# Patient Record
Sex: MALE | Race: WHITE | Employment: FULL TIME | URBAN - METROPOLITAN AREA
[De-identification: names, ages, dates, MRNs, and addresses within clinical notes are randomized per-mention and may not be internally consistent; named-entity substitution may affect disease eponyms.]

---

## 2024-02-18 LAB — HBA1C MFR BLD HPLC: 6.3 %

## 2024-03-15 ENCOUNTER — OFFICE VISIT (OUTPATIENT)
Dept: FAMILY MEDICINE CLINIC | Facility: CLINIC | Age: 64
End: 2024-03-15
Payer: COMMERCIAL

## 2024-03-15 VITALS
DIASTOLIC BLOOD PRESSURE: 90 MMHG | WEIGHT: 197.6 LBS | HEART RATE: 92 BPM | BODY MASS INDEX: 31.01 KG/M2 | HEIGHT: 67 IN | SYSTOLIC BLOOD PRESSURE: 158 MMHG | OXYGEN SATURATION: 94 % | TEMPERATURE: 98.3 F | RESPIRATION RATE: 16 BRPM

## 2024-03-15 DIAGNOSIS — I10 PRIMARY HYPERTENSION: ICD-10-CM

## 2024-03-15 DIAGNOSIS — Z86.73 HISTORY OF CVA (CEREBROVASCULAR ACCIDENT): ICD-10-CM

## 2024-03-15 DIAGNOSIS — R04.0 EPISTAXIS: ICD-10-CM

## 2024-03-15 DIAGNOSIS — Z76.89 ESTABLISHING CARE WITH NEW DOCTOR, ENCOUNTER FOR: Primary | ICD-10-CM

## 2024-03-15 DIAGNOSIS — E78.2 MIXED HYPERLIPIDEMIA: ICD-10-CM

## 2024-03-15 PROCEDURE — 99204 OFFICE O/P NEW MOD 45 MIN: CPT | Performed by: FAMILY MEDICINE

## 2024-03-15 RX ORDER — CARVEDILOL 12.5 MG/1
TABLET ORAL
COMMUNITY
Start: 2024-02-20 | End: 2024-03-15 | Stop reason: SDUPTHER

## 2024-03-15 RX ORDER — CLOPIDOGREL BISULFATE 75 MG/1
TABLET ORAL
COMMUNITY
Start: 2024-02-20 | End: 2024-03-15

## 2024-03-15 RX ORDER — ATORVASTATIN CALCIUM 80 MG/1
TABLET, FILM COATED ORAL
COMMUNITY
Start: 2024-02-20 | End: 2024-03-15 | Stop reason: SDUPTHER

## 2024-03-15 RX ORDER — ATORVASTATIN CALCIUM 80 MG/1
80 TABLET, FILM COATED ORAL DAILY
Qty: 90 TABLET | Refills: 1 | Status: SHIPPED | OUTPATIENT
Start: 2024-03-15

## 2024-03-15 RX ORDER — CARVEDILOL 12.5 MG/1
12.5 TABLET ORAL 2 TIMES DAILY WITH MEALS
Qty: 180 TABLET | Refills: 1 | Status: SHIPPED | OUTPATIENT
Start: 2024-03-15

## 2024-03-15 RX ORDER — AMLODIPINE BESYLATE 10 MG/1
10 TABLET ORAL DAILY
Qty: 90 TABLET | Refills: 1 | Status: SHIPPED | OUTPATIENT
Start: 2024-03-15

## 2024-03-15 RX ORDER — AMLODIPINE BESYLATE 10 MG/1
TABLET ORAL
COMMUNITY
Start: 2024-02-20 | End: 2024-03-15 | Stop reason: SDUPTHER

## 2024-03-15 NOTE — PROGRESS NOTES
Name: Doron Braxton      : 1960      MRN: [id number]  Encounter Provider: Ruben Church MD  Encounter Date: 3/15/2024   Encounter department: [facility]    Assessment & Plan     1. Establishing care with new doctor, encounter for    2. Primary hypertension    3. Mixed hyperlipidemia    4. Epistaxis    5. History of CVA (cerebrovascular accident)        Depression Screening and Follow-up Plan: Patient was screened for depression during today's encounter. They screened negative with a PHQ-2 score of 0.        Subjective      PATIENT IS A NEW PATIENT TO THE PRACTICE      REVIEWED PAST MEDICAL HISTORY AND MEDICATIONS  DISCUSSED PRACTICE AND HEALTH ISSUES    RECENT ER VISIT FOR EPISTAXIS  ELEVATED BP / ELEVATED CHOL    DISCUSSED THERAPEUTIC PLAN      Review of Systems   Constitutional:  Negative for chills, fatigue and fever.   HENT:  Negative for congestion, ear discharge, ear pain, mouth sores, postnasal drip, sore throat and trouble swallowing.    Eyes:  Negative for pain, discharge and visual disturbance.   Respiratory:  Negative for cough, shortness of breath and wheezing.    Cardiovascular:  Negative for chest pain, palpitations and leg swelling.   Gastrointestinal:  Negative for abdominal distention, abdominal pain, blood in stool, diarrhea and nausea.   Endocrine: Negative for polydipsia, polyphagia and polyuria.   Genitourinary:  Negative for dysuria, frequency, hematuria and urgency.   Musculoskeletal:  Negative for arthralgias, gait problem and joint swelling.   Skin:  Negative for pallor and rash.   Neurological:  Negative for dizziness, syncope, speech difficulty, weakness, light-headedness, numbness and headaches.   Hematological:  Negative for adenopathy.   Psychiatric/Behavioral:  Negative for behavioral problems, confusion and sleep disturbance. The patient is not nervous/anxious.        Current Outpatient Medications on File Prior to Visit   Medication Sig   •  "amLODIPine (NORVASC) 10 mg tablet    • atorvastatin (LIPITOR) 80 mg tablet    • carvedilol (COREG) 12.5 mg tablet    • clopidogrel (PLAVIX) 75 mg tablet  (Patient not taking: Reported on 3/15/2024)       Objective     /90   Pulse 92   Temp 98.3 °F (36.8 °C) (Temporal)   Resp 16   Ht 5' 7\" (1.702 m)   Wt 89.6 kg (197 lb 9.6 oz)   SpO2 94%   BMI 30.95 kg/m²     Physical Exam  Vitals reviewed.   Constitutional:       General: He is not in acute distress.     Appearance: Normal appearance. He is well-developed. He is not ill-appearing.   HENT:      Head: Normocephalic and atraumatic.   Eyes:      General:         Right eye: No discharge.         Left eye: No discharge.      Conjunctiva/sclera: Conjunctivae normal.      Pupils: Pupils are equal, round, and reactive to light.   Neck:      Thyroid: No thyromegaly.      Vascular: No JVD.   Cardiovascular:      Rate and Rhythm: Normal rate and regular rhythm.      Heart sounds: Normal heart sounds. No murmur heard.  Pulmonary:      Effort: Pulmonary effort is normal.      Breath sounds: Normal breath sounds. No wheezing or rales.   Abdominal:      General: Bowel sounds are normal.      Palpations: Abdomen is soft. There is no mass.      Tenderness: There is no abdominal tenderness. There is no guarding or rebound.   Musculoskeletal:         General: No tenderness or deformity. Normal range of motion.      Cervical back: Neck supple.   Lymphadenopathy:      Cervical: No cervical adenopathy.   Skin:     General: Skin is warm and dry.      Findings: No erythema or rash.   Neurological:      General: No focal deficit present.      Mental Status: He is alert and oriented to person, place, and time.   Psychiatric:         Mood and Affect: Mood normal.         Behavior: Behavior normal.         Thought Content: Thought content normal.         Judgment: Judgment normal.       Ruben Church MD    "

## 2024-03-15 NOTE — PATIENT INSTRUCTIONS
DISCUSSED HEALTH AND PRACTICE ISSUES    HYDRATION  NASAL SALINE SPRAY  MONITOR BP  MEDICATION AS DIRECTED    RV 2-3 M FOR CPX, SOONER PRN

## 2024-04-10 DIAGNOSIS — Z13.29 SCREENING FOR THYROID DISORDER: ICD-10-CM

## 2024-04-10 DIAGNOSIS — Z12.5 SCREENING FOR PROSTATE CANCER: ICD-10-CM

## 2024-04-10 DIAGNOSIS — E78.2 MIXED HYPERLIPIDEMIA: ICD-10-CM

## 2024-04-10 DIAGNOSIS — Z00.00 ROUTINE GENERAL MEDICAL EXAMINATION AT A HEALTH CARE FACILITY: ICD-10-CM

## 2024-04-10 DIAGNOSIS — Z11.59 NEED FOR HEPATITIS C SCREENING TEST: ICD-10-CM

## 2024-04-10 DIAGNOSIS — Z11.4 SCREENING FOR HIV (HUMAN IMMUNODEFICIENCY VIRUS): ICD-10-CM

## 2024-04-10 DIAGNOSIS — I10 PRIMARY HYPERTENSION: Primary | ICD-10-CM

## 2024-04-14 DIAGNOSIS — E78.2 MIXED HYPERLIPIDEMIA: ICD-10-CM

## 2024-04-14 DIAGNOSIS — Z86.73 HISTORY OF CVA (CEREBROVASCULAR ACCIDENT): ICD-10-CM

## 2024-04-14 DIAGNOSIS — I10 PRIMARY HYPERTENSION: ICD-10-CM

## 2024-04-15 RX ORDER — AMLODIPINE BESYLATE 10 MG/1
10 TABLET ORAL DAILY
Qty: 90 TABLET | Refills: 1 | Status: SHIPPED | OUTPATIENT
Start: 2024-04-15

## 2024-04-15 RX ORDER — CARVEDILOL 12.5 MG/1
12.5 TABLET ORAL 2 TIMES DAILY WITH MEALS
Qty: 180 TABLET | Refills: 1 | Status: SHIPPED | OUTPATIENT
Start: 2024-04-15

## 2024-04-15 RX ORDER — ATORVASTATIN CALCIUM 80 MG/1
80 TABLET, FILM COATED ORAL DAILY
Qty: 90 TABLET | Refills: 1 | Status: SHIPPED | OUTPATIENT
Start: 2024-04-15

## 2024-05-15 LAB
ALBUMIN SERPL-MCNC: 4.5 G/DL (ref 3.9–4.9)
ALBUMIN/GLOB SERPL: 1.8 {RATIO} (ref 1.2–2.2)
ALP SERPL-CCNC: 130 IU/L (ref 44–121)
ALT SERPL-CCNC: 22 IU/L (ref 0–44)
AST SERPL-CCNC: 27 IU/L (ref 0–40)
BASOPHILS # BLD AUTO: 0.1 X10E3/UL (ref 0–0.2)
BASOPHILS NFR BLD AUTO: 1 %
BILIRUB SERPL-MCNC: 0.8 MG/DL (ref 0–1.2)
BUN SERPL-MCNC: 18 MG/DL (ref 8–27)
BUN/CREAT SERPL: 16 (ref 10–24)
CALCIUM SERPL-MCNC: 9.4 MG/DL (ref 8.6–10.2)
CHLORIDE SERPL-SCNC: 104 MMOL/L (ref 96–106)
CHOLEST SERPL-MCNC: 135 MG/DL (ref 100–199)
CHOLEST/HDLC SERPL: 3.3 RATIO (ref 0–5)
CO2 SERPL-SCNC: 24 MMOL/L (ref 20–29)
CREAT SERPL-MCNC: 1.11 MG/DL (ref 0.76–1.27)
EGFR: 75 ML/MIN/1.73
EOSINOPHIL # BLD AUTO: 0.4 X10E3/UL (ref 0–0.4)
EOSINOPHIL NFR BLD AUTO: 4 %
ERYTHROCYTE [DISTWIDTH] IN BLOOD BY AUTOMATED COUNT: 14.4 % (ref 11.6–15.4)
GLOBULIN SER-MCNC: 2.5 G/DL (ref 1.5–4.5)
GLUCOSE SERPL-MCNC: 101 MG/DL (ref 70–99)
HCT VFR BLD AUTO: 38.2 % (ref 37.5–51)
HCV AB S/CO SERPL IA: NON REACTIVE
HDLC SERPL-MCNC: 41 MG/DL
HGB BLD-MCNC: 12.4 G/DL (ref 13–17.7)
HIV 1+2 AB+HIV1 P24 AG SERPL QL IA: NON REACTIVE
IMM GRANULOCYTES # BLD: 0 X10E3/UL (ref 0–0.1)
IMM GRANULOCYTES NFR BLD: 0 %
LDLC SERPL CALC-MCNC: 75 MG/DL (ref 0–99)
LYMPHOCYTES # BLD AUTO: 2 X10E3/UL (ref 0.7–3.1)
LYMPHOCYTES NFR BLD AUTO: 18 %
MCH RBC QN AUTO: 28.6 PG (ref 26.6–33)
MCHC RBC AUTO-ENTMCNC: 32.5 G/DL (ref 31.5–35.7)
MCV RBC AUTO: 88 FL (ref 79–97)
MONOCYTES # BLD AUTO: 0.9 X10E3/UL (ref 0.1–0.9)
MONOCYTES NFR BLD AUTO: 8 %
NEUTROPHILS # BLD AUTO: 7.5 X10E3/UL (ref 1.4–7)
NEUTROPHILS NFR BLD AUTO: 69 %
PLATELET # BLD AUTO: 260 X10E3/UL (ref 150–450)
POTASSIUM SERPL-SCNC: 4.5 MMOL/L (ref 3.5–5.2)
PROT SERPL-MCNC: 7 G/DL (ref 6–8.5)
PSA SERPL-MCNC: 2.6 NG/ML (ref 0–4)
RBC # BLD AUTO: 4.33 X10E6/UL (ref 4.14–5.8)
SL AMB REFLEX CRITERIA: NORMAL
SL AMB VLDL CHOLESTEROL CALC: 19 MG/DL (ref 5–40)
SODIUM SERPL-SCNC: 141 MMOL/L (ref 134–144)
TRIGL SERPL-MCNC: 105 MG/DL (ref 0–149)
TSH SERPL DL<=0.005 MIU/L-ACNC: 4.13 UIU/ML (ref 0.45–4.5)
WBC # BLD AUTO: 10.9 X10E3/UL (ref 3.4–10.8)

## 2024-05-18 DIAGNOSIS — E78.2 MIXED HYPERLIPIDEMIA: ICD-10-CM

## 2024-05-18 DIAGNOSIS — I10 PRIMARY HYPERTENSION: ICD-10-CM

## 2024-05-18 DIAGNOSIS — Z86.73 HISTORY OF CVA (CEREBROVASCULAR ACCIDENT): ICD-10-CM

## 2024-05-18 RX ORDER — ATORVASTATIN CALCIUM 80 MG/1
80 TABLET, FILM COATED ORAL DAILY
Qty: 90 TABLET | Refills: 1 | Status: SHIPPED | OUTPATIENT
Start: 2024-05-18

## 2024-05-18 RX ORDER — AMLODIPINE BESYLATE 10 MG/1
10 TABLET ORAL DAILY
Qty: 90 TABLET | Refills: 1 | Status: SHIPPED | OUTPATIENT
Start: 2024-05-18

## 2024-05-18 RX ORDER — CARVEDILOL 12.5 MG/1
12.5 TABLET ORAL 2 TIMES DAILY WITH MEALS
Qty: 180 TABLET | Refills: 1 | Status: SHIPPED | OUTPATIENT
Start: 2024-05-18

## 2024-06-12 ENCOUNTER — OFFICE VISIT (OUTPATIENT)
Dept: FAMILY MEDICINE CLINIC | Facility: CLINIC | Age: 64
End: 2024-06-12
Payer: COMMERCIAL

## 2024-06-12 VITALS
SYSTOLIC BLOOD PRESSURE: 136 MMHG | OXYGEN SATURATION: 95 % | HEIGHT: 68 IN | TEMPERATURE: 97.6 F | DIASTOLIC BLOOD PRESSURE: 84 MMHG | BODY MASS INDEX: 29.86 KG/M2 | WEIGHT: 197 LBS | RESPIRATION RATE: 16 BRPM | HEART RATE: 83 BPM

## 2024-06-12 DIAGNOSIS — Z86.73 HISTORY OF CVA (CEREBROVASCULAR ACCIDENT): ICD-10-CM

## 2024-06-12 DIAGNOSIS — Z13.29 SCREENING FOR HYPOTHYROIDISM: ICD-10-CM

## 2024-06-12 DIAGNOSIS — Z00.00 ROUTINE GENERAL MEDICAL EXAMINATION AT A HEALTH CARE FACILITY: Primary | ICD-10-CM

## 2024-06-12 DIAGNOSIS — Z12.11 COLON CANCER SCREENING: ICD-10-CM

## 2024-06-12 DIAGNOSIS — E78.2 MIXED HYPERLIPIDEMIA: ICD-10-CM

## 2024-06-12 DIAGNOSIS — D64.9 ANEMIA, UNSPECIFIED TYPE: ICD-10-CM

## 2024-06-12 DIAGNOSIS — Z12.5 ENCOUNTER FOR PROSTATE CANCER SCREENING: ICD-10-CM

## 2024-06-12 DIAGNOSIS — I10 PRIMARY HYPERTENSION: ICD-10-CM

## 2024-06-12 PROBLEM — R04.0 EPISTAXIS: Status: RESOLVED | Noted: 2024-03-15 | Resolved: 2024-06-12

## 2024-06-12 LAB — SL AMB POCT FECES OCC BLD: NORMAL

## 2024-06-12 PROCEDURE — 82270 OCCULT BLOOD FECES: CPT | Performed by: FAMILY MEDICINE

## 2024-06-12 PROCEDURE — 99396 PREV VISIT EST AGE 40-64: CPT | Performed by: FAMILY MEDICINE

## 2024-06-12 RX ORDER — CARVEDILOL 25 MG/1
25 TABLET ORAL 2 TIMES DAILY WITH MEALS
COMMUNITY
Start: 2024-06-11

## 2024-06-12 NOTE — PATIENT INSTRUCTIONS
DISCUSSED HEALTH MAINTENENCE ISSUES  BW WILL BE TVIEWED  ENCOURAGED HEALTHY DIET AND EXERCISE  COLONOSCOPY WILL BE ORDERED  RV FOR ANNUAL HEALTH EXAM IN 1 YEAR  RV SOONER IF THERE ARE ANY CONCERNS    RV 6M    Recent Results (from the past 2688 hour(s))   CBC and differential    Collection Time: 05/14/24 10:20 AM   Result Value Ref Range    White Blood Cell Count 10.9 (H) 3.4 - 10.8 x10E3/uL    Red Blood Cell Count 4.33 4.14 - 5.80 x10E6/uL    Hemoglobin 12.4 (L) 13.0 - 17.7 g/dL    HCT 38.2 37.5 - 51.0 %    MCV 88 79 - 97 fL    MCH 28.6 26.6 - 33.0 pg    MCHC 32.5 31.5 - 35.7 g/dL    RDW 14.4 11.6 - 15.4 %    Platelet Count 260 150 - 450 x10E3/uL    Neutrophils 69 Not Estab. %    Lymphocytes 18 Not Estab. %    Monocytes 8 Not Estab. %    Eosinophils 4 Not Estab. %    Basophils PCT 1 Not Estab. %    Neutrophils (Absolute) 7.5 (H) 1.4 - 7.0 x10E3/uL    Lymphocytes (Absolute) 2.0 0.7 - 3.1 x10E3/uL    Monocytes (Absolute) 0.9 0.1 - 0.9 x10E3/uL    Eosinophils (Absolute) 0.4 0.0 - 0.4 x10E3/uL    Basophils ABS 0.1 0.0 - 0.2 x10E3/uL    Immature Granulocytes 0 Not Estab. %    Immature Granulocytes (Absolute) 0.0 0.0 - 0.1 x10E3/uL   Comprehensive metabolic panel    Collection Time: 05/14/24 10:20 AM   Result Value Ref Range    Glucose, Random 101 (H) 70 - 99 mg/dL    BUN 18 8 - 27 mg/dL    Creatinine 1.11 0.76 - 1.27 mg/dL    eGFR 75 >59 mL/min/1.73    SL AMB BUN/CREATININE RATIO 16 10 - 24    Sodium 141 134 - 144 mmol/L    Potassium 4.5 3.5 - 5.2 mmol/L    Chloride 104 96 - 106 mmol/L    CO2 24 20 - 29 mmol/L    CALCIUM 9.4 8.6 - 10.2 mg/dL    Protein, Total 7.0 6.0 - 8.5 g/dL    Albumin 4.5 3.9 - 4.9 g/dL    Globulin, Total 2.5 1.5 - 4.5 g/dL    Albumin/Globulin Ratio 1.8 1.2 - 2.2    TOTAL BILIRUBIN 0.8 0.0 - 1.2 mg/dL    Alk Phos Isoenzymes 130 (H) 44 - 121 IU/L    AST 27 0 - 40 IU/L    ALT 22 0 - 44 IU/L   Hepatitis C antibody    Collection Time: 05/14/24 10:20 AM   Result Value Ref Range    HEP C AB Non Reactive  Non Reactive   Human Immunodeficiency Virus 1/2 Antigen / Antibody ( Fourth Generation) with Reflex Testing    Collection Time: 05/14/24 10:20 AM   Result Value Ref Range    HIV Screen 4th Generation wRflx Non Reactive Non Reactive   Lipid panel    Collection Time: 05/14/24 10:20 AM   Result Value Ref Range    Cholesterol, Total 135 100 - 199 mg/dL    Triglycerides 105 0 - 149 mg/dL    HDL 41 >39 mg/dL    VLDL Cholesterol Calculated 19 5 - 40 mg/dL    LDL Calculated 75 0 - 99 mg/dL    T. Chol/HDL Ratio 3.3 0.0 - 5.0 ratio   PSA Total (Reflex To Free)    Collection Time: 05/14/24 10:20 AM   Result Value Ref Range    Prostate Specific Antigen Total 2.6 0.0 - 4.0 ng/mL    Reflex Criteria Comment    TSH, 3rd generation    Collection Time: 05/14/24 10:20 AM   Result Value Ref Range    TSH 4.130 0.450 - 4.500 uIU/mL

## 2024-06-12 NOTE — LETTER
EMANUEL EPPS      Current Outpatient Medications:     amLODIPine (NORVASC) 10 mg tablet, Take 1 tablet (10 mg total) by mouth daily, Disp: 90 tablet, Rfl: 1    atorvastatin (LIPITOR) 80 mg tablet, Take 1 tablet (80 mg total) by mouth daily, Disp: 90 tablet, Rfl: 1    carvedilol (COREG) 12.5 mg tablet, Take 1 tablet (12.5 mg total) by mouth 2 (two) times a day with meals, Disp: 180 tablet, Rfl: 1    Recent Results (from the past 2688 hour(s))   CBC and differential    Collection Time: 05/14/24 10:20 AM   Result Value Ref Range    White Blood Cell Count 10.9 (H) 3.4 - 10.8 x10E3/uL    Red Blood Cell Count 4.33 4.14 - 5.80 x10E6/uL    Hemoglobin 12.4 (L) 13.0 - 17.7 g/dL    HCT 38.2 37.5 - 51.0 %    MCV 88 79 - 97 fL    MCH 28.6 26.6 - 33.0 pg    MCHC 32.5 31.5 - 35.7 g/dL    RDW 14.4 11.6 - 15.4 %    Platelet Count 260 150 - 450 x10E3/uL    Neutrophils 69 Not Estab. %    Lymphocytes 18 Not Estab. %    Monocytes 8 Not Estab. %    Eosinophils 4 Not Estab. %    Basophils PCT 1 Not Estab. %    Neutrophils (Absolute) 7.5 (H) 1.4 - 7.0 x10E3/uL    Lymphocytes (Absolute) 2.0 0.7 - 3.1 x10E3/uL    Monocytes (Absolute) 0.9 0.1 - 0.9 x10E3/uL    Eosinophils (Absolute) 0.4 0.0 - 0.4 x10E3/uL    Basophils ABS 0.1 0.0 - 0.2 x10E3/uL    Immature Granulocytes 0 Not Estab. %    Immature Granulocytes (Absolute) 0.0 0.0 - 0.1 x10E3/uL   Comprehensive metabolic panel    Collection Time: 05/14/24 10:20 AM   Result Value Ref Range    Glucose, Random 101 (H) 70 - 99 mg/dL    BUN 18 8 - 27 mg/dL    Creatinine 1.11 0.76 - 1.27 mg/dL    eGFR 75 >59 mL/min/1.73    SL AMB BUN/CREATININE RATIO 16 10 - 24    Sodium 141 134 - 144 mmol/L    Potassium 4.5 3.5 - 5.2 mmol/L    Chloride 104 96 - 106 mmol/L    CO2 24 20 - 29 mmol/L    CALCIUM 9.4 8.6 - 10.2 mg/dL    Protein, Total 7.0 6.0 - 8.5 g/dL    Albumin 4.5 3.9 - 4.9 g/dL    Globulin, Total 2.5 1.5 - 4.5 g/dL    Albumin/Globulin Ratio 1.8 1.2 - 2.2    TOTAL BILIRUBIN 0.8 0.0 - 1.2 mg/dL    Alk  Phos Isoenzymes 130 (H) 44 - 121 IU/L    AST 27 0 - 40 IU/L    ALT 22 0 - 44 IU/L   Hepatitis C antibody    Collection Time: 05/14/24 10:20 AM   Result Value Ref Range    HEP C AB Non Reactive Non Reactive   Human Immunodeficiency Virus 1/2 Antigen / Antibody ( Fourth Generation) with Reflex Testing    Collection Time: 05/14/24 10:20 AM   Result Value Ref Range    HIV Screen 4th Generation wRflx Non Reactive Non Reactive   Lipid panel    Collection Time: 05/14/24 10:20 AM   Result Value Ref Range    Cholesterol, Total 135 100 - 199 mg/dL    Triglycerides 105 0 - 149 mg/dL    HDL 41 >39 mg/dL    VLDL Cholesterol Calculated 19 5 - 40 mg/dL    LDL Calculated 75 0 - 99 mg/dL    T. Chol/HDL Ratio 3.3 0.0 - 5.0 ratio   PSA Total (Reflex To Free)    Collection Time: 05/14/24 10:20 AM   Result Value Ref Range    Prostate Specific Antigen Total 2.6 0.0 - 4.0 ng/mL    Reflex Criteria Comment    TSH, 3rd generation    Collection Time: 05/14/24 10:20 AM   Result Value Ref Range    TSH 4.130 0.450 - 4.500 uIU/mL

## 2024-06-12 NOTE — PROGRESS NOTES
Depression Screening and Follow-up Plan: Patient was screened for depression during today's encounter. They screened negative with a PHQ-2 score of 0.    FAMILY PRACTICE HEALTH MAINTENANCE OFFICE VISIT  [facility]    NAME: Doron Braxton  AGE: 64 y.o. SEX: male  : 1960     DATE: 2024    Assessment and Plan     Problem List Items Addressed This Visit        Cardiovascular and Mediastinum    Primary hypertension    Relevant Medications    carvedilol (COREG) 25 mg tablet    Other Relevant Orders    Comprehensive metabolic panel       Neurology/Sleep    History of CVA (cerebrovascular accident)       Other    Mixed hyperlipidemia   Other Visit Diagnoses     Routine general medical examination at a health care facility    -  Primary    Screening for hypothyroidism        Colon cancer screening        Relevant Orders    POCT hemoccult screening (Completed)    Encounter for prostate cancer screening        Anemia, unspecified type        Relevant Orders    CBC and differential    Iron    Vitamin B12    Folate    Sedimentation rate, automated    Protein electrophoresis, serum               Return in about 6 weeks (around 2024) for Recheck.        Chief Complaint     Chief Complaint   Patient presents with   • Physical Exam       History of Present Illness     DISCUSSED HEALTH ISSUES  REVIEWED MEDICAL RECORD  NO CONCERNS AT THIS TIME            Well Adult Physical   Patient here for a comprehensive physical exam.      Diet and Physical Activity  Diet: well balanced diet  Weight concerns: Patient has class 1 obesity (BMI 30-34.9)  Exercise: occasionally      Depression Screen  PHQ-2/9 Depression Screening    Little interest or pleasure in doing things: 0 - not at all  Feeling down, depressed, or hopeless: 0 - not at all  PHQ-2 Score: 0  PHQ-2 Interpretation: Negative depression screen          General Health  Hearing: Normal:  bilateral  Vision: no  vision problems and wears glasses  Dental: regular dental visits    Reproductive Health          The following portions of the patient's history were reviewed and updated as appropriate: allergies, current medications, past family history, past medical history, past social history, past surgical history and problem list.    Review of Systems     Review of Systems   Constitutional:  Negative for chills, fatigue and fever.   HENT:  Negative for congestion, ear discharge, ear pain, mouth sores, postnasal drip, sore throat and trouble swallowing.    Eyes:  Negative for pain, discharge and visual disturbance.   Respiratory:  Negative for cough, shortness of breath and wheezing.    Cardiovascular:  Negative for chest pain, palpitations and leg swelling.   Gastrointestinal:  Negative for abdominal distention, abdominal pain, blood in stool, diarrhea and nausea.   Endocrine: Negative for polydipsia, polyphagia and polyuria.   Genitourinary:  Negative for dysuria, frequency, hematuria and urgency.   Musculoskeletal:  Negative for arthralgias, gait problem and joint swelling.   Skin:  Negative for pallor and rash.   Neurological:  Negative for dizziness, syncope, speech difficulty, weakness, light-headedness, numbness and headaches.   Hematological:  Negative for adenopathy.   Psychiatric/Behavioral:  Negative for behavioral problems, confusion and sleep disturbance. The patient is not nervous/anxious.        Past Medical History     History reviewed. No pertinent past medical history.    Past Surgical History     History reviewed. No pertinent surgical history.    Social History     Social History     Socioeconomic History   • Marital status: /Civil Union     Spouse name: None   • Number of children: None   • Years of education: None   • Highest education level: None   Occupational History   • None   Tobacco Use   • Smoking status: Never     Passive exposure: Never   • Smokeless tobacco: Never   Vaping Use   • Vaping  "status: Never Used   Substance and Sexual Activity   • Alcohol use: Yes     Alcohol/week: 2.0 standard drinks of alcohol     Types: 2 Cans of beer per week     Comment: socially   • Drug use: Never   • Sexual activity: None   Other Topics Concern   • None   Social History Narrative   • None     Social Determinants of Health     Financial Resource Strain: Not on file   Food Insecurity: Not on file   Transportation Needs: Not on file   Physical Activity: Not on file   Stress: Not on file   Social Connections: Not on file   Intimate Partner Violence: Not on file   Housing Stability: Not on file       Family History     History reviewed. No pertinent family history.    Current Medications       Current Outpatient Medications:   •  amLODIPine (NORVASC) 10 mg tablet, Take 1 tablet (10 mg total) by mouth daily, Disp: 90 tablet, Rfl: 1  •  atorvastatin (LIPITOR) 80 mg tablet, Take 1 tablet (80 mg total) by mouth daily, Disp: 90 tablet, Rfl: 1  •  carvedilol (COREG) 25 mg tablet, Take 25 mg by mouth 2 (two) times a day with meals, Disp: , Rfl:      Allergies     No Known Allergies    Objective     /84 (BP Location: Left arm, Patient Position: Sitting, Cuff Size: Large)   Pulse 83   Temp 97.6 °F (36.4 °C) (Temporal)   Resp 16   Ht 5' 7.5\" (1.715 m)   Wt 89.4 kg (197 lb)   SpO2 95%   BMI 30.40 kg/m²      Physical Exam  Constitutional:       General: He is not in acute distress.     Appearance: Normal appearance. He is well-developed. He is not ill-appearing.   HENT:      Head: Normocephalic and atraumatic.      Right Ear: Tympanic membrane and external ear normal.      Left Ear: Tympanic membrane and external ear normal.      Nose: Nose normal.      Mouth/Throat:      Mouth: Mucous membranes are moist.   Eyes:      General:         Right eye: No discharge.         Left eye: No discharge.      Conjunctiva/sclera: Conjunctivae normal.      Pupils: Pupils are equal, round, and reactive to light.   Neck:      Thyroid: " No thyromegaly.      Vascular: No JVD.   Cardiovascular:      Rate and Rhythm: Normal rate and regular rhythm.      Heart sounds: Normal heart sounds. No murmur heard.  Pulmonary:      Effort: Pulmonary effort is normal.      Breath sounds: Normal breath sounds. No wheezing or rales.   Abdominal:      General: Bowel sounds are normal.      Palpations: Abdomen is soft. There is no mass.      Tenderness: There is no abdominal tenderness. There is no guarding or rebound.   Genitourinary:     Penis: Normal.       Testes: Normal.      Prostate: Normal.      Rectum: Normal. Guaiac result negative.   Musculoskeletal:         General: No tenderness or deformity. Normal range of motion.      Cervical back: Neck supple.   Lymphadenopathy:      Cervical: No cervical adenopathy.   Skin:     General: Skin is warm and dry.      Findings: No erythema or rash.   Neurological:      General: No focal deficit present.      Mental Status: He is alert and oriented to person, place, and time.      Cranial Nerves: No cranial nerve deficit.      Sensory: No sensory deficit.      Motor: No weakness or abnormal muscle tone.      Coordination: Coordination normal.      Gait: Gait normal.      Deep Tendon Reflexes: Reflexes are normal and symmetric. Reflexes normal.   Psychiatric:         Mood and Affect: Mood normal.         Behavior: Behavior normal.         Thought Content: Thought content normal.         Judgment: Judgment normal.           No results found.    Health Maintenance     Health Maintenance   Topic Date Due   • DTaP,Tdap,and Td Vaccines (1 - Tdap) Never done   • Colorectal Cancer Screening  Never done   • Zoster Vaccine (1 of 2) Never done   • RSV Vaccine Age 60+ Years (1 - 1-dose 60+ series) Never done   • COVID-19 Vaccine (3 - 2023-24 season) 09/01/2023   • Influenza Vaccine (Season Ended) 09/01/2024   • Depression Screening  06/12/2025   • Annual Physical  06/12/2025   • HIV Screening  Completed   • Hepatitis C Screening   Completed   • RSV Vaccine age 0-20 Months  Aged Out   • Pneumococcal Vaccine: Pediatrics (0 to 5 Years) and At-Risk Patients (6 to 64 Years)  Aged Out   • HIB Vaccine  Aged Out   • IPV Vaccine  Aged Out   • Hepatitis A Vaccine  Aged Out   • Meningococcal ACWY Vaccine  Aged Out   • HPV Vaccine  Aged Out     Immunization History   Administered Date(s) Administered   • COVID-19 MODERNA VACC 0.5 ML IM 04/25/2021, 05/23/2021       Ruben Church MD  [facility]

## 2024-06-13 DIAGNOSIS — I10 PRIMARY HYPERTENSION: ICD-10-CM

## 2024-06-13 DIAGNOSIS — Z86.73 HISTORY OF CVA (CEREBROVASCULAR ACCIDENT): ICD-10-CM

## 2024-06-13 DIAGNOSIS — E78.2 MIXED HYPERLIPIDEMIA: ICD-10-CM

## 2024-06-13 RX ORDER — AMLODIPINE BESYLATE 10 MG/1
10 TABLET ORAL DAILY
Qty: 90 TABLET | Refills: 1 | Status: SHIPPED | OUTPATIENT
Start: 2024-06-13

## 2024-06-13 RX ORDER — ATORVASTATIN CALCIUM 80 MG/1
80 TABLET, FILM COATED ORAL DAILY
Qty: 90 TABLET | Refills: 1 | Status: SHIPPED | OUTPATIENT
Start: 2024-06-13

## 2024-07-13 DIAGNOSIS — Z86.73 HISTORY OF CVA (CEREBROVASCULAR ACCIDENT): ICD-10-CM

## 2024-07-13 DIAGNOSIS — I10 PRIMARY HYPERTENSION: ICD-10-CM

## 2024-07-13 DIAGNOSIS — E78.2 MIXED HYPERLIPIDEMIA: ICD-10-CM

## 2024-07-13 RX ORDER — CARVEDILOL 25 MG/1
25 TABLET ORAL 2 TIMES DAILY WITH MEALS
Qty: 180 TABLET | Refills: 1 | Status: SHIPPED | OUTPATIENT
Start: 2024-07-13

## 2024-07-13 RX ORDER — AMLODIPINE BESYLATE 10 MG/1
10 TABLET ORAL DAILY
Qty: 90 TABLET | Refills: 0 | Status: SHIPPED | OUTPATIENT
Start: 2024-07-13

## 2024-07-13 RX ORDER — ATORVASTATIN CALCIUM 80 MG/1
80 TABLET, FILM COATED ORAL DAILY
Qty: 90 TABLET | Refills: 1 | Status: SHIPPED | OUTPATIENT
Start: 2024-07-13

## 2024-07-25 LAB
ALBUMIN SERPL ELPH-MCNC: 3.6 G/DL (ref 2.9–4.4)
ALBUMIN SERPL-MCNC: 4.3 G/DL (ref 3.9–4.9)
ALBUMIN/GLOB SERPL: 1.1 {RATIO} (ref 0.7–1.7)
ALP SERPL-CCNC: 122 IU/L (ref 44–121)
ALPHA1 GLOB SERPL ELPH-MCNC: 0.2 G/DL (ref 0–0.4)
ALPHA2 GLOB SERPL ELPH-MCNC: 0.8 G/DL (ref 0.4–1)
ALT SERPL-CCNC: 21 IU/L (ref 0–44)
AST SERPL-CCNC: 22 IU/L (ref 0–40)
B-GLOBULIN SERPL ELPH-MCNC: 1.1 G/DL (ref 0.7–1.3)
BASOPHILS # BLD AUTO: 0.1 X10E3/UL (ref 0–0.2)
BASOPHILS NFR BLD AUTO: 1 %
BILIRUB SERPL-MCNC: 0.7 MG/DL (ref 0–1.2)
BUN SERPL-MCNC: 17 MG/DL (ref 8–27)
BUN/CREAT SERPL: 20 (ref 10–24)
CALCIUM SERPL-MCNC: 9.1 MG/DL (ref 8.6–10.2)
CHLORIDE SERPL-SCNC: 104 MMOL/L (ref 96–106)
CO2 SERPL-SCNC: 23 MMOL/L (ref 20–29)
CREAT SERPL-MCNC: 0.87 MG/DL (ref 0.76–1.27)
EGFR: 97 ML/MIN/1.73
EOSINOPHIL # BLD AUTO: 0.4 X10E3/UL (ref 0–0.4)
EOSINOPHIL NFR BLD AUTO: 4 %
ERYTHROCYTE [DISTWIDTH] IN BLOOD BY AUTOMATED COUNT: 15.2 % (ref 11.6–15.4)
ERYTHROCYTE [SEDIMENTATION RATE] IN BLOOD BY WESTERGREN METHOD: 6 MM/HR (ref 0–30)
FOLATE SERPL-MCNC: 14.9 NG/ML
GAMMA GLOB SERPL ELPH-MCNC: 1.1 G/DL (ref 0.4–1.8)
GLOBULIN SER CALC-MCNC: 3.2 G/DL (ref 2.2–3.9)
GLOBULIN SER-MCNC: 2.5 G/DL (ref 1.5–4.5)
GLUCOSE SERPL-MCNC: 106 MG/DL (ref 70–99)
HCT VFR BLD AUTO: 41.4 % (ref 37.5–51)
HGB BLD-MCNC: 13.6 G/DL (ref 13–17.7)
IMM GRANULOCYTES # BLD: 0 X10E3/UL (ref 0–0.1)
IMM GRANULOCYTES NFR BLD: 0 %
IRON SERPL-MCNC: 171 UG/DL (ref 38–169)
LABORATORY COMMENT REPORT: NORMAL
LYMPHOCYTES # BLD AUTO: 2.1 X10E3/UL (ref 0.7–3.1)
LYMPHOCYTES NFR BLD AUTO: 22 %
M PROTEIN SERPL ELPH-MCNC: NORMAL G/DL
MCH RBC QN AUTO: 29.7 PG (ref 26.6–33)
MCHC RBC AUTO-ENTMCNC: 32.9 G/DL (ref 31.5–35.7)
MCV RBC AUTO: 90 FL (ref 79–97)
MONOCYTES # BLD AUTO: 0.9 X10E3/UL (ref 0.1–0.9)
MONOCYTES NFR BLD AUTO: 9 %
NEUTROPHILS # BLD AUTO: 5.9 X10E3/UL (ref 1.4–7)
NEUTROPHILS NFR BLD AUTO: 64 %
PLATELET # BLD AUTO: 247 X10E3/UL (ref 150–450)
POTASSIUM SERPL-SCNC: 4.1 MMOL/L (ref 3.5–5.2)
PROT SERPL-MCNC: 6.8 G/DL (ref 6–8.5)
RBC # BLD AUTO: 4.58 X10E6/UL (ref 4.14–5.8)
SODIUM SERPL-SCNC: 141 MMOL/L (ref 134–144)
VIT B12 SERPL-MCNC: 582 PG/ML (ref 232–1245)
WBC # BLD AUTO: 9.4 X10E3/UL (ref 3.4–10.8)

## 2024-07-31 RX ORDER — ASPIRIN 81 MG/1
1 TABLET ORAL DAILY
COMMUNITY
Start: 2024-04-29

## 2024-07-31 NOTE — PATIENT INSTRUCTIONS
CONTINUE CURRENT TREATMENT PLAN  MONITOR DIETARY SODIUM, CHOL INTAKE  ENCOURAGE PHYSICAL ACTIVITY      RV 6 M, SOONER PRN      Recent Results (from the past 672 hour(s))   CBC and differential    Collection Time: 07/24/24  8:35 AM   Result Value Ref Range    White Blood Cell Count 9.4 3.4 - 10.8 x10E3/uL    Red Blood Cell Count 4.58 4.14 - 5.80 x10E6/uL    Hemoglobin 13.6 13.0 - 17.7 g/dL    HCT 41.4 37.5 - 51.0 %    MCV 90 79 - 97 fL    MCH 29.7 26.6 - 33.0 pg    MCHC 32.9 31.5 - 35.7 g/dL    RDW 15.2 11.6 - 15.4 %    Platelet Count 247 150 - 450 x10E3/uL    Neutrophils 64 Not Estab. %    Lymphocytes 22 Not Estab. %    Monocytes 9 Not Estab. %    Eosinophils 4 Not Estab. %    Basophils PCT 1 Not Estab. %    Neutrophils (Absolute) 5.9 1.4 - 7.0 x10E3/uL    Lymphocytes (Absolute) 2.1 0.7 - 3.1 x10E3/uL    Monocytes (Absolute) 0.9 0.1 - 0.9 x10E3/uL    Eosinophils (Absolute) 0.4 0.0 - 0.4 x10E3/uL    Basophils ABS 0.1 0.0 - 0.2 x10E3/uL    Immature Granulocytes 0 Not Estab. %    Immature Granulocytes (Absolute) 0.0 0.0 - 0.1 x10E3/uL   Comprehensive metabolic panel    Collection Time: 07/24/24  8:35 AM   Result Value Ref Range    Glucose, Random 106 (H) 70 - 99 mg/dL    BUN 17 8 - 27 mg/dL    Creatinine 0.87 0.76 - 1.27 mg/dL    eGFR 97 >59 mL/min/1.73    SL AMB BUN/CREATININE RATIO 20 10 - 24    Sodium 141 134 - 144 mmol/L    Potassium 4.1 3.5 - 5.2 mmol/L    Chloride 104 96 - 106 mmol/L    CO2 23 20 - 29 mmol/L    CALCIUM 9.1 8.6 - 10.2 mg/dL    Protein, Total 6.8 6.0 - 8.5 g/dL    Albumin 4.3 3.9 - 4.9 g/dL    Globulin, Total 2.5 1.5 - 4.5 g/dL    TOTAL BILIRUBIN 0.7 0.0 - 1.2 mg/dL    Alk Phos Isoenzymes 122 (H) 44 - 121 IU/L    AST 22 0 - 40 IU/L    ALT 21 0 - 44 IU/L   Iron    Collection Time: 07/24/24  8:35 AM   Result Value Ref Range    Iron, Serum 171 (H) 38 - 169 ug/dL   Vitamin B12    Collection Time: 07/24/24  8:35 AM   Result Value Ref Range    Vitamin B-12 582 232 - 1,245 pg/mL   Folate    Collection  Time: 07/24/24  8:35 AM   Result Value Ref Range    FOLATE, SERUM 14.9 >3.0 ng/mL   Sedimentation rate, automated    Collection Time: 07/24/24  8:35 AM   Result Value Ref Range    Sedimentation Rate 6 0 - 30 mm/hr   Protein electrophoresis, serum    Collection Time: 07/24/24  8:35 AM   Result Value Ref Range    Albumin, Electrophoresis 3.6 2.9 - 4.4 g/dL    Alpha-1 Globulin 0.2 0.0 - 0.4 g/dL    Alpha-2 Globulin 0.8 0.4 - 1.0 g/dL    Beta Globulin 1.1 0.7 - 1.3 g/dL    Gamma Globulin 1.1 0.4 - 1.8 g/dL    M-Mark Not Observed Not Observed g/dL    Globulin 3.2 2.2 - 3.9 g/dL    Albumin/Globulin Ratio 1.1 0.7 - 1.7    Please note Comment

## 2024-08-01 ENCOUNTER — OFFICE VISIT (OUTPATIENT)
Dept: FAMILY MEDICINE CLINIC | Facility: CLINIC | Age: 64
End: 2024-08-01
Payer: COMMERCIAL

## 2024-08-01 VITALS
DIASTOLIC BLOOD PRESSURE: 82 MMHG | WEIGHT: 203 LBS | SYSTOLIC BLOOD PRESSURE: 152 MMHG | HEIGHT: 68 IN | BODY MASS INDEX: 30.77 KG/M2 | TEMPERATURE: 97.3 F | RESPIRATION RATE: 20 BRPM | HEART RATE: 66 BPM | OXYGEN SATURATION: 94 %

## 2024-08-01 DIAGNOSIS — I10 BENIGN ESSENTIAL HYPERTENSION: Primary | ICD-10-CM

## 2024-08-01 DIAGNOSIS — E78.2 MIXED HYPERLIPIDEMIA: ICD-10-CM

## 2024-08-01 DIAGNOSIS — Z86.73 HISTORY OF CVA (CEREBROVASCULAR ACCIDENT): ICD-10-CM

## 2024-08-01 PROCEDURE — 3077F SYST BP >= 140 MM HG: CPT | Performed by: FAMILY MEDICINE

## 2024-08-01 PROCEDURE — 99214 OFFICE O/P EST MOD 30 MIN: CPT | Performed by: FAMILY MEDICINE

## 2024-08-01 PROCEDURE — 3079F DIAST BP 80-89 MM HG: CPT | Performed by: FAMILY MEDICINE

## 2024-08-01 NOTE — PROGRESS NOTES
Ambulatory Visit  Name: Doron Braxton      : 1960      MRN: [id number]  Encounter Provider: Ruben Church MD  Encounter Date: 2024   Encounter department: [facility]    Assessment & Plan   1. Benign essential hypertension  Assessment & Plan:  STABLE  DENIES ANY CP, SOB, PALPITATIONS, OR HEADACHE  NOTES NO WATER RETENTION  COMPLIANT WITH MEDICATION  NO CONCERNS    - CONTINUE CURRENT TREATMENT PLAN  - MONITOR DIETARY SODIUM INTAKE  - ENCOURAGE PHYSICAL ACTIVITY  - RV 6 MONTHS    2. Mixed hyperlipidemia  Assessment & Plan:  WATCHING CHOLESTEROL INTAKE  COMPLIANT WITH MEDICATION  NO CONCERNS    - CONTINUE TO MONITOR DIETARY CHOL INTAKE  - CONTINUE CURRENT MEDICATION  - ENCOURAGED PHYSICAL ACTIVITY    3. History of CVA (cerebrovascular accident)         History of Present Illness     PATIENT RETURNS FOR ROUTINE EVALUATION OF PATIENT'S MEDICAL ISSUES    INDIVIDUAL MEDICAL ISSUES WITH THEIR CURRENT STATUS, ASSESSMENT AND PLANS ARE LISTED ABOVE      SL FATIGUE WITH INCREASE IN COREG  WOULD LIKE TO CUT BACK EVENING DOSE TO 12.5      Review of Systems   Constitutional:  Negative for chills, fatigue and fever.   HENT:  Negative for congestion, ear discharge, ear pain, mouth sores, postnasal drip, sore throat and trouble swallowing.    Eyes:  Negative for pain, discharge and visual disturbance.   Respiratory:  Negative for cough, shortness of breath and wheezing.    Cardiovascular:  Negative for chest pain, palpitations and leg swelling.   Gastrointestinal:  Negative for abdominal distention, abdominal pain, blood in stool, diarrhea and nausea.   Endocrine: Negative for polydipsia, polyphagia and polyuria.   Genitourinary:  Negative for dysuria, frequency, hematuria and urgency.   Musculoskeletal:  Negative for arthralgias, gait problem and joint swelling.   Skin:  Negative for pallor and rash.   Neurological:  Negative for dizziness, syncope, speech difficulty, weakness,  "light-headedness, numbness and headaches.   Hematological:  Negative for adenopathy.   Psychiatric/Behavioral:  Negative for behavioral problems, confusion and sleep disturbance. The patient is not nervous/anxious.      History reviewed. No pertinent past medical history.  History reviewed. No pertinent surgical history.  History reviewed. No pertinent family history.  Social History     Tobacco Use   • Smoking status: Never     Passive exposure: Never   • Smokeless tobacco: Never   Vaping Use   • Vaping status: Never Used   Substance and Sexual Activity   • Alcohol use: Yes     Alcohol/week: 2.0 standard drinks of alcohol     Types: 2 Cans of beer per week     Comment: socially   • Drug use: Never   • Sexual activity: Not on file     Current Outpatient Medications on File Prior to Visit   Medication Sig   • amLODIPine (NORVASC) 10 mg tablet Take 1 tablet (10 mg total) by mouth daily   • aspirin (Miniprin Low Dose) 81 mg EC tablet Take 1 tablet by mouth daily   • atorvastatin (LIPITOR) 80 mg tablet Take 1 tablet (80 mg total) by mouth daily   • carvedilol (COREG) 25 mg tablet Take 1 tablet (25 mg total) by mouth 2 (two) times a day with meals     No Known Allergies  Immunization History   Administered Date(s) Administered   • COVID-19 MODERNA VACC 0.5 ML IM 04/25/2021, 05/23/2021   • Tdap 04/20/2011     Objective     /82 (BP Location: Left arm, Patient Position: Sitting, Cuff Size: Large)   Pulse 66   Temp (!) 97.3 °F (36.3 °C) (Temporal)   Resp 20   Ht 5' 7.5\" (1.715 m)   Wt 92.1 kg (203 lb)   SpO2 94%   BMI 31.33 kg/m²     Physical Exam  Vitals reviewed.   Constitutional:       General: He is not in acute distress.     Appearance: Normal appearance. He is well-developed. He is not ill-appearing.   HENT:      Head: Normocephalic and atraumatic.      Nose: Nose normal.   Eyes:      General:         Right eye: No discharge.         Left eye: No discharge.      Conjunctiva/sclera: Conjunctivae normal.     "  Pupils: Pupils are equal, round, and reactive to light.   Neck:      Thyroid: No thyromegaly.      Vascular: No JVD.   Cardiovascular:      Rate and Rhythm: Normal rate and regular rhythm.      Heart sounds: Normal heart sounds. No murmur heard.  Pulmonary:      Effort: Pulmonary effort is normal.      Breath sounds: Normal breath sounds. No wheezing or rales.   Abdominal:      General: Bowel sounds are normal.      Palpations: Abdomen is soft. There is no mass.      Tenderness: There is no abdominal tenderness. There is no guarding or rebound.   Musculoskeletal:         General: No tenderness or deformity. Normal range of motion.      Cervical back: Neck supple.   Lymphadenopathy:      Cervical: No cervical adenopathy.   Skin:     General: Skin is warm and dry.      Findings: No erythema or rash.   Neurological:      General: No focal deficit present.      Mental Status: He is alert and oriented to person, place, and time.   Psychiatric:         Mood and Affect: Mood normal.         Behavior: Behavior normal.         Thought Content: Thought content normal.         Judgment: Judgment normal.

## 2024-08-01 NOTE — LETTER
EMANUEL ARRIETA      Current Outpatient Medications:     aspirin (Miniprin Low Dose) 81 mg EC tablet, Take 1 tablet by mouth daily, Disp: , Rfl:     amLODIPine (NORVASC) 10 mg tablet, Take 1 tablet (10 mg total) by mouth daily, Disp: 90 tablet, Rfl: 0    atorvastatin (LIPITOR) 80 mg tablet, Take 1 tablet (80 mg total) by mouth daily, Disp: 90 tablet, Rfl: 1    carvedilol (COREG) 25 mg tablet, Take 1 tablet (25 mg total) by mouth 2 (two) times a day with meals, Disp: 180 tablet, Rfl: 1      Recent Results (from the past 672 hour(s))   CBC and differential    Collection Time: 07/24/24  8:35 AM   Result Value Ref Range    White Blood Cell Count 9.4 3.4 - 10.8 x10E3/uL    Red Blood Cell Count 4.58 4.14 - 5.80 x10E6/uL    Hemoglobin 13.6 13.0 - 17.7 g/dL    HCT 41.4 37.5 - 51.0 %    MCV 90 79 - 97 fL    MCH 29.7 26.6 - 33.0 pg    MCHC 32.9 31.5 - 35.7 g/dL    RDW 15.2 11.6 - 15.4 %    Platelet Count 247 150 - 450 x10E3/uL    Neutrophils 64 Not Estab. %    Lymphocytes 22 Not Estab. %    Monocytes 9 Not Estab. %    Eosinophils 4 Not Estab. %    Basophils PCT 1 Not Estab. %    Neutrophils (Absolute) 5.9 1.4 - 7.0 x10E3/uL    Lymphocytes (Absolute) 2.1 0.7 - 3.1 x10E3/uL    Monocytes (Absolute) 0.9 0.1 - 0.9 x10E3/uL    Eosinophils (Absolute) 0.4 0.0 - 0.4 x10E3/uL    Basophils ABS 0.1 0.0 - 0.2 x10E3/uL    Immature Granulocytes 0 Not Estab. %    Immature Granulocytes (Absolute) 0.0 0.0 - 0.1 x10E3/uL   Comprehensive metabolic panel    Collection Time: 07/24/24  8:35 AM   Result Value Ref Range    Glucose, Random 106 (H) 70 - 99 mg/dL    BUN 17 8 - 27 mg/dL    Creatinine 0.87 0.76 - 1.27 mg/dL    eGFR 97 >59 mL/min/1.73    SL AMB BUN/CREATININE RATIO 20 10 - 24    Sodium 141 134 - 144 mmol/L    Potassium 4.1 3.5 - 5.2 mmol/L    Chloride 104 96 - 106 mmol/L    CO2 23 20 - 29 mmol/L    CALCIUM 9.1 8.6 - 10.2 mg/dL    Protein, Total 6.8 6.0 - 8.5 g/dL    Albumin 4.3 3.9 - 4.9 g/dL    Globulin, Total 2.5 1.5 - 4.5 g/dL    TOTAL  BILIRUBIN 0.7 0.0 - 1.2 mg/dL    Alk Phos Isoenzymes 122 (H) 44 - 121 IU/L    AST 22 0 - 40 IU/L    ALT 21 0 - 44 IU/L   Iron    Collection Time: 07/24/24  8:35 AM   Result Value Ref Range    Iron, Serum 171 (H) 38 - 169 ug/dL   Vitamin B12    Collection Time: 07/24/24  8:35 AM   Result Value Ref Range    Vitamin B-12 582 232 - 1,245 pg/mL   Folate    Collection Time: 07/24/24  8:35 AM   Result Value Ref Range    FOLATE, SERUM 14.9 >3.0 ng/mL   Sedimentation rate, automated    Collection Time: 07/24/24  8:35 AM   Result Value Ref Range    Sedimentation Rate 6 0 - 30 mm/hr   Protein electrophoresis, serum    Collection Time: 07/24/24  8:35 AM   Result Value Ref Range    Albumin, Electrophoresis 3.6 2.9 - 4.4 g/dL    Alpha-1 Globulin 0.2 0.0 - 0.4 g/dL    Alpha-2 Globulin 0.8 0.4 - 1.0 g/dL    Beta Globulin 1.1 0.7 - 1.3 g/dL    Gamma Globulin 1.1 0.4 - 1.8 g/dL    M-Mark Not Observed Not Observed g/dL    Globulin 3.2 2.2 - 3.9 g/dL    Albumin/Globulin Ratio 1.1 0.7 - 1.7    Please note Comment

## 2024-08-16 DIAGNOSIS — E78.2 MIXED HYPERLIPIDEMIA: ICD-10-CM

## 2024-08-16 DIAGNOSIS — Z86.73 HISTORY OF CVA (CEREBROVASCULAR ACCIDENT): ICD-10-CM

## 2024-08-16 DIAGNOSIS — I10 PRIMARY HYPERTENSION: ICD-10-CM

## 2024-08-16 RX ORDER — AMLODIPINE BESYLATE 10 MG/1
10 TABLET ORAL DAILY
Qty: 90 TABLET | Refills: 1 | Status: SHIPPED | OUTPATIENT
Start: 2024-08-16

## 2024-08-16 RX ORDER — ATORVASTATIN CALCIUM 80 MG/1
80 TABLET, FILM COATED ORAL DAILY
Qty: 90 TABLET | Refills: 1 | Status: SHIPPED | OUTPATIENT
Start: 2024-08-16

## 2024-08-16 RX ORDER — CARVEDILOL 25 MG/1
25 TABLET ORAL 2 TIMES DAILY WITH MEALS
Qty: 180 TABLET | Refills: 1 | Status: SHIPPED | OUTPATIENT
Start: 2024-08-16 | End: 2024-08-19 | Stop reason: SDUPTHER

## 2024-08-19 DIAGNOSIS — I10 PRIMARY HYPERTENSION: ICD-10-CM

## 2024-08-19 RX ORDER — CARVEDILOL 12.5 MG/1
12.5 TABLET ORAL DAILY
Qty: 90 TABLET | Refills: 1 | Status: SHIPPED | OUTPATIENT
Start: 2024-08-19

## 2024-08-19 RX ORDER — CARVEDILOL 25 MG/1
25 TABLET ORAL DAILY
Qty: 90 TABLET | Refills: 1 | Status: SHIPPED | OUTPATIENT
Start: 2024-08-19

## 2024-09-14 DIAGNOSIS — E78.2 MIXED HYPERLIPIDEMIA: ICD-10-CM

## 2024-09-14 DIAGNOSIS — Z86.73 HISTORY OF CVA (CEREBROVASCULAR ACCIDENT): ICD-10-CM

## 2024-09-14 DIAGNOSIS — I10 PRIMARY HYPERTENSION: ICD-10-CM

## 2024-09-15 RX ORDER — CARVEDILOL 25 MG/1
25 TABLET ORAL DAILY
Qty: 90 TABLET | Refills: 1 | Status: SHIPPED | OUTPATIENT
Start: 2024-09-15

## 2024-09-15 RX ORDER — ATORVASTATIN CALCIUM 80 MG/1
80 TABLET, FILM COATED ORAL DAILY
Qty: 90 TABLET | Refills: 1 | Status: SHIPPED | OUTPATIENT
Start: 2024-09-15

## 2024-09-15 RX ORDER — AMLODIPINE BESYLATE 10 MG/1
10 TABLET ORAL DAILY
Qty: 90 TABLET | Refills: 1 | Status: SHIPPED | OUTPATIENT
Start: 2024-09-15

## 2024-09-15 RX ORDER — CARVEDILOL 12.5 MG/1
12.5 TABLET ORAL DAILY
Qty: 90 TABLET | Refills: 1 | Status: SHIPPED | OUTPATIENT
Start: 2024-09-15

## 2024-10-07 ENCOUNTER — TELEPHONE (OUTPATIENT)
Dept: FAMILY MEDICINE CLINIC | Facility: CLINIC | Age: 64
End: 2024-10-07

## 2024-10-07 NOTE — TELEPHONE ENCOUNTER
Spoke to Dr. Todd's Surgical Coordinator.  He is going to go under General Anesthesia    He did not have his bw done yet.    He will need an EKG    His cardiac clearance is not done yet.

## 2024-10-07 NOTE — PATIENT INSTRUCTIONS
THERE IS NO OBVIOUS MEDICAL CONTRAINDICATION FOR SURGERY UNDER GENERAL ANESTHESIA  APPROPRIATE DVT PROPHYLAXIS SHOULD BE PROVIDED    CARDIAC CLEARANCE WILL BE OBTAINED    Recent Results (from the past 672 hour(s))   CBC and differential    Collection Time: 10/08/24  8:55 AM   Result Value Ref Range    White Blood Cell Count 7.3 3.4 - 10.8 x10E3/uL    Red Blood Cell Count 4.76 4.14 - 5.80 x10E6/uL    Hemoglobin 15.0 13.0 - 17.7 g/dL    HCT 45.5 37.5 - 51.0 %    MCV 96 79 - 97 fL    MCH 31.5 26.6 - 33.0 pg    MCHC 33.0 31.5 - 35.7 g/dL    RDW 13.4 11.6 - 15.4 %    Platelet Count 232 150 - 450 x10E3/uL    Neutrophils 63 Not Estab. %    Lymphocytes 23 Not Estab. %    Monocytes 9 Not Estab. %    Eosinophils 4 Not Estab. %    Basophils PCT 1 Not Estab. %    Neutrophils (Absolute) 4.5 1.4 - 7.0 x10E3/uL    Lymphocytes (Absolute) 1.7 0.7 - 3.1 x10E3/uL    Monocytes (Absolute) 0.7 0.1 - 0.9 x10E3/uL    Eosinophils (Absolute) 0.3 0.0 - 0.4 x10E3/uL    Basophils ABS 0.1 0.0 - 0.2 x10E3/uL    Immature Granulocytes 0 Not Estab. %    Immature Granulocytes (Absolute) 0.0 0.0 - 0.1 x10E3/uL   Comprehensive metabolic panel    Collection Time: 10/08/24  8:55 AM   Result Value Ref Range    Glucose, Random 200 (H) 70 - 99 mg/dL    BUN 27 8 - 27 mg/dL    Creatinine 1.28 (H) 0.76 - 1.27 mg/dL    eGFR 63 >59 mL/min/1.73    SL AMB BUN/CREATININE RATIO 21 10 - 24    Sodium 142 134 - 144 mmol/L    Potassium 4.3 3.5 - 5.2 mmol/L    Chloride 106 96 - 106 mmol/L    CO2 20 20 - 29 mmol/L    CALCIUM 9.9 8.6 - 10.2 mg/dL    Protein, Total 7.1 6.0 - 8.5 g/dL    Albumin 4.4 3.9 - 4.9 g/dL    Globulin, Total 2.7 1.5 - 4.5 g/dL    TOTAL BILIRUBIN 0.6 0.0 - 1.2 mg/dL    Alk Phos Isoenzymes 132 (H) 44 - 121 IU/L    AST 27 0 - 40 IU/L    ALT 24 0 - 44 IU/L   Lipid panel    Collection Time: 10/08/24  8:55 AM   Result Value Ref Range    Cholesterol, Total 160 100 - 199 mg/dL    Triglycerides 105 0 - 149 mg/dL    HDL 38 (L) >39 mg/dL    VLDL Cholesterol  Calculated 19 5 - 40 mg/dL    LDL Calculated 103 (H) 0 - 99 mg/dL    T. Chol/HDL Ratio 4.2 0.0 - 5.0 ratio

## 2024-10-07 NOTE — TELEPHONE ENCOUNTER
Doron has an appt tomorrow for a pre op clearance    Dr. Church wanted to know if Doron will be under General anesthesia or local with sedation    Left message at [facility] ([phone]) requesting this information

## 2024-10-08 ENCOUNTER — CONSULT (OUTPATIENT)
Dept: FAMILY MEDICINE CLINIC | Facility: CLINIC | Age: 64
End: 2024-10-08
Payer: COMMERCIAL

## 2024-10-08 VITALS
WEIGHT: 208 LBS | TEMPERATURE: 97.7 F | OXYGEN SATURATION: 98 % | HEIGHT: 68 IN | RESPIRATION RATE: 16 BRPM | SYSTOLIC BLOOD PRESSURE: 128 MMHG | DIASTOLIC BLOOD PRESSURE: 78 MMHG | BODY MASS INDEX: 31.52 KG/M2 | HEART RATE: 69 BPM

## 2024-10-08 DIAGNOSIS — Z01.818 PREOPERATIVE CLEARANCE: Primary | ICD-10-CM

## 2024-10-08 DIAGNOSIS — I10 BENIGN ESSENTIAL HYPERTENSION: ICD-10-CM

## 2024-10-08 DIAGNOSIS — E78.2 MIXED HYPERLIPIDEMIA: ICD-10-CM

## 2024-10-08 DIAGNOSIS — Z86.73 HISTORY OF CVA (CEREBROVASCULAR ACCIDENT): ICD-10-CM

## 2024-10-08 PROCEDURE — 93000 ELECTROCARDIOGRAM COMPLETE: CPT | Performed by: FAMILY MEDICINE

## 2024-10-08 PROCEDURE — 99214 OFFICE O/P EST MOD 30 MIN: CPT | Performed by: FAMILY MEDICINE

## 2024-10-08 NOTE — PROGRESS NOTES
Ambulatory Visit  Name: Doron Braxton      : 1960      MRN: [id number]  Encounter Provider: Ruben Church MD  Encounter Date: 10/8/2024   Encounter department: [facility]    Assessment & Plan  Preoperative clearance    Orders:    POCT ECG    CBC and differential    Comprehensive metabolic panel    Lipid panel    Benign essential hypertension    Orders:    POCT ECG    Comprehensive metabolic panel    Mixed hyperlipidemia    Orders:    Comprehensive metabolic panel    Lipid panel    History of CVA (cerebrovascular accident)              History of Present Illness     Pre-op Exam  Surgery: L LITTLE FINGER PARTIAL FASCIOTOMY  Anticipated Date of Surgery: 10/31/2024  Surgeon: KVNG RIVAS MD    PATIENT IS SCHEDULED FOR ELECTIVE FINGER SURGERY    PAST MEDICAL HISTORY / MEDICATIONS  WERE REVIEWED  PATIENT HAS PAST HISTORY OF MILD CVA EARLIER THIS YEAR    PATIENT FEELS WELL  DENIES CHEST PAIN, SOB, PALPITATIONS  NO RECENT ILLNESS, FEVER OR CHILLS  NO NVD    Previous history of bleeding disorders or clots?: No  Previous Anesthesia reaction?: No  Prolonged steroid use in the last 6 months?: No    Assessment of Cardiac Risk:   - Unstable or severe angina or MI in the last 6 weeks or history of stent placement in the last year?: No   - Decompensated heart failure (e.g. New onset heart failure, NYHA  Class IV heart failure, or worsening existing heart failure)?: No  - Significant arrhythmias such as high grade AV block, symptomatic ventricular arrhythmia, newly recognized ventricular tachycardia, supraventricular tachycardia with resting heart rate >100, or symptomatic bradycardia?: No  - Severe heart valve disease including aortic stenosis or symptomatic mitral stenosis?: No      Pre-operative Risk Factors:  Elevated-risk surgery: Yes    History of cerebrovascular disease: Yes  History of ischemic heart disease: No  Pre-operative treatment with insulin: No  Pre-operative creatinine  >2 mg/dL: No    History of congestive heart failure: No    Duke Activity Status Index (DASI):   DASI Total Score: 44.2  METs: 8.2    Medications of Perioperative Concern:   Anti-platelet (aspirin, clopidogrel, ticagrelor, prasugrel, cilostazol, dipyridamole), Calcium channel blockers and Beta blockers    Review of Systems   Constitutional:  Negative for chills, fatigue and fever.   HENT:  Negative for congestion, ear discharge, ear pain, mouth sores, postnasal drip, sore throat and trouble swallowing.    Eyes:  Negative for pain, discharge and visual disturbance.   Respiratory:  Negative for cough, shortness of breath and wheezing.    Cardiovascular:  Negative for chest pain, palpitations and leg swelling.   Gastrointestinal:  Negative for abdominal distention, abdominal pain, blood in stool, diarrhea and nausea.   Endocrine: Negative for polydipsia, polyphagia and polyuria.   Genitourinary:  Negative for dysuria, frequency, hematuria and urgency.   Musculoskeletal:  Negative for arthralgias, gait problem and joint swelling.   Skin:  Negative for pallor and rash.   Neurological:  Negative for dizziness, syncope, speech difficulty, weakness, light-headedness, numbness and headaches.   Hematological:  Negative for adenopathy.   Psychiatric/Behavioral:  Negative for behavioral problems, confusion and sleep disturbance. The patient is not nervous/anxious.      History reviewed. No pertinent past medical history.  History reviewed. No pertinent surgical history.  History reviewed. No pertinent family history.  Social History     Tobacco Use    Smoking status: Never     Passive exposure: Never    Smokeless tobacco: Never   Vaping Use    Vaping status: Never Used   Substance and Sexual Activity    Alcohol use: Yes     Alcohol/week: 2.0 standard drinks of alcohol     Types: 2 Cans of beer per week     Comment: socially    Drug use: Never    Sexual activity: Not on file     Current Outpatient Medications on File Prior to  "Visit   Medication Sig    amLODIPine (NORVASC) 10 mg tablet Take 1 tablet (10 mg total) by mouth daily    aspirin (Miniprin Low Dose) 81 mg EC tablet Take 1 tablet by mouth daily    atorvastatin (LIPITOR) 80 mg tablet Take 1 tablet (80 mg total) by mouth daily    carvedilol (COREG) 12.5 mg tablet Take 1 tablet (12.5 mg total) by mouth in the morning    carvedilol (COREG) 25 mg tablet Take 1 tablet (25 mg total) by mouth daily     No Known Allergies  Immunization History   Administered Date(s) Administered    COVID-19 MODERNA VACC 0.5 ML IM 04/25/2021, 05/23/2021    Tdap 04/20/2011     Objective     /78 (BP Location: Left arm, Patient Position: Sitting, Cuff Size: Standard)   Pulse 69   Temp 97.7 °F (36.5 °C) (Temporal)   Resp 16   Ht 5' 7.5\" (1.715 m)   Wt 94.3 kg (208 lb)   SpO2 98%   BMI 32.10 kg/m²     Physical Exam  Vitals reviewed.   Constitutional:       General: He is not in acute distress.     Appearance: Normal appearance. He is well-developed. He is not ill-appearing.   HENT:      Head: Normocephalic and atraumatic.      Nose: Nose normal. No congestion.      Mouth/Throat:      Mouth: Mucous membranes are moist.   Eyes:      General:         Right eye: No discharge.         Left eye: No discharge.      Conjunctiva/sclera: Conjunctivae normal.      Pupils: Pupils are equal, round, and reactive to light.   Neck:      Thyroid: No thyromegaly.      Vascular: No JVD.   Cardiovascular:      Rate and Rhythm: Normal rate and regular rhythm.      Heart sounds: Normal heart sounds. No murmur heard.  Pulmonary:      Effort: Pulmonary effort is normal.      Breath sounds: Normal breath sounds. No wheezing or rales.   Abdominal:      General: Bowel sounds are normal.      Palpations: Abdomen is soft. There is no mass.      Tenderness: There is no abdominal tenderness. There is no guarding or rebound.   Musculoskeletal:         General: No tenderness or deformity. Normal range of motion.      Cervical back: " Neck supple.   Lymphadenopathy:      Cervical: No cervical adenopathy.   Skin:     General: Skin is warm and dry.      Findings: No erythema or rash.   Neurological:      General: No focal deficit present.      Mental Status: He is alert and oriented to person, place, and time.   Psychiatric:         Mood and Affect: Mood normal.         Behavior: Behavior normal.         Thought Content: Thought content normal.         Judgment: Judgment normal.       Recent Results (from the past 672 hour(s))   CBC and differential    Collection Time: 10/08/24  8:55 AM   Result Value Ref Range    White Blood Cell Count 7.3 3.4 - 10.8 x10E3/uL    Red Blood Cell Count 4.76 4.14 - 5.80 x10E6/uL    Hemoglobin 15.0 13.0 - 17.7 g/dL    HCT 45.5 37.5 - 51.0 %    MCV 96 79 - 97 fL    MCH 31.5 26.6 - 33.0 pg    MCHC 33.0 31.5 - 35.7 g/dL    RDW 13.4 11.6 - 15.4 %    Platelet Count 232 150 - 450 x10E3/uL    Neutrophils 63 Not Estab. %    Lymphocytes 23 Not Estab. %    Monocytes 9 Not Estab. %    Eosinophils 4 Not Estab. %    Basophils PCT 1 Not Estab. %    Neutrophils (Absolute) 4.5 1.4 - 7.0 x10E3/uL    Lymphocytes (Absolute) 1.7 0.7 - 3.1 x10E3/uL    Monocytes (Absolute) 0.7 0.1 - 0.9 x10E3/uL    Eosinophils (Absolute) 0.3 0.0 - 0.4 x10E3/uL    Basophils ABS 0.1 0.0 - 0.2 x10E3/uL    Immature Granulocytes 0 Not Estab. %    Immature Granulocytes (Absolute) 0.0 0.0 - 0.1 x10E3/uL   Comprehensive metabolic panel    Collection Time: 10/08/24  8:55 AM   Result Value Ref Range    Glucose, Random 200 (H) 70 - 99 mg/dL    BUN 27 8 - 27 mg/dL    Creatinine 1.28 (H) 0.76 - 1.27 mg/dL    eGFR 63 >59 mL/min/1.73    SL AMB BUN/CREATININE RATIO 21 10 - 24    Sodium 142 134 - 144 mmol/L    Potassium 4.3 3.5 - 5.2 mmol/L    Chloride 106 96 - 106 mmol/L    CO2 20 20 - 29 mmol/L    CALCIUM 9.9 8.6 - 10.2 mg/dL    Protein, Total 7.1 6.0 - 8.5 g/dL    Albumin 4.4 3.9 - 4.9 g/dL    Globulin, Total 2.7 1.5 - 4.5 g/dL    TOTAL BILIRUBIN 0.6 0.0 - 1.2 mg/dL     Alk Phos Isoenzymes 132 (H) 44 - 121 IU/L    AST 27 0 - 40 IU/L    ALT 24 0 - 44 IU/L   Lipid panel    Collection Time: 10/08/24  8:55 AM   Result Value Ref Range    Cholesterol, Total 160 100 - 199 mg/dL    Triglycerides 105 0 - 149 mg/dL    HDL 38 (L) >39 mg/dL    VLDL Cholesterol Calculated 19 5 - 40 mg/dL    LDL Calculated 103 (H) 0 - 99 mg/dL    T. Chol/HDL Ratio 4.2 0.0 - 5.0 ratio

## 2024-10-08 NOTE — LETTER
EMANUEL ARRIETA      Current Outpatient Medications:     amLODIPine (NORVASC) 10 mg tablet, Take 1 tablet (10 mg total) by mouth daily, Disp: 90 tablet, Rfl: 1    aspirin (Miniprin Low Dose) 81 mg EC tablet, Take 1 tablet by mouth daily, Disp: , Rfl:     atorvastatin (LIPITOR) 80 mg tablet, Take 1 tablet (80 mg total) by mouth daily, Disp: 90 tablet, Rfl: 1    carvedilol (COREG) 12.5 mg tablet, Take 1 tablet (12.5 mg total) by mouth in the morning, Disp: 90 tablet, Rfl: 1    carvedilol (COREG) 25 mg tablet, Take 1 tablet (25 mg total) by mouth daily, Disp: 90 tablet, Rfl: 1      Recent Results (from the past 672 hour(s))   CBC and differential    Collection Time: 10/08/24  8:55 AM   Result Value Ref Range    White Blood Cell Count 7.3 3.4 - 10.8 x10E3/uL    Red Blood Cell Count 4.76 4.14 - 5.80 x10E6/uL    Hemoglobin 15.0 13.0 - 17.7 g/dL    HCT 45.5 37.5 - 51.0 %    MCV 96 79 - 97 fL    MCH 31.5 26.6 - 33.0 pg    MCHC 33.0 31.5 - 35.7 g/dL    RDW 13.4 11.6 - 15.4 %    Platelet Count 232 150 - 450 x10E3/uL    Neutrophils 63 Not Estab. %    Lymphocytes 23 Not Estab. %    Monocytes 9 Not Estab. %    Eosinophils 4 Not Estab. %    Basophils PCT 1 Not Estab. %    Neutrophils (Absolute) 4.5 1.4 - 7.0 x10E3/uL    Lymphocytes (Absolute) 1.7 0.7 - 3.1 x10E3/uL    Monocytes (Absolute) 0.7 0.1 - 0.9 x10E3/uL    Eosinophils (Absolute) 0.3 0.0 - 0.4 x10E3/uL    Basophils ABS 0.1 0.0 - 0.2 x10E3/uL    Immature Granulocytes 0 Not Estab. %    Immature Granulocytes (Absolute) 0.0 0.0 - 0.1 x10E3/uL   Comprehensive metabolic panel    Collection Time: 10/08/24  8:55 AM   Result Value Ref Range    Glucose, Random 200 (H) 70 - 99 mg/dL    BUN 27 8 - 27 mg/dL    Creatinine 1.28 (H) 0.76 - 1.27 mg/dL    eGFR 63 >59 mL/min/1.73    SL AMB BUN/CREATININE RATIO 21 10 - 24    Sodium 142 134 - 144 mmol/L    Potassium 4.3 3.5 - 5.2 mmol/L    Chloride 106 96 - 106 mmol/L    CO2 20 20 - 29 mmol/L    CALCIUM 9.9 8.6 - 10.2 mg/dL    Protein, Total  7.1 6.0 - 8.5 g/dL    Albumin 4.4 3.9 - 4.9 g/dL    Globulin, Total 2.7 1.5 - 4.5 g/dL    TOTAL BILIRUBIN 0.6 0.0 - 1.2 mg/dL    Alk Phos Isoenzymes 132 (H) 44 - 121 IU/L    AST 27 0 - 40 IU/L    ALT 24 0 - 44 IU/L   Lipid panel    Collection Time: 10/08/24  8:55 AM   Result Value Ref Range    Cholesterol, Total 160 100 - 199 mg/dL    Triglycerides 105 0 - 149 mg/dL    HDL 38 (L) >39 mg/dL    VLDL Cholesterol Calculated 19 5 - 40 mg/dL    LDL Calculated 103 (H) 0 - 99 mg/dL    T. Chol/HDL Ratio 4.2 0.0 - 5.0 ratio

## 2024-10-08 NOTE — ASSESSMENT & PLAN NOTE
PATIENT IS SCHEDULED FOR ELECTIVE FINGER SURGERY    DATE OF PROCEDURE -    SURGEON -    PAST MEDICAL HIST

## 2024-10-09 LAB
ALBUMIN SERPL-MCNC: 4.4 G/DL (ref 3.9–4.9)
ALP SERPL-CCNC: 132 IU/L (ref 44–121)
ALT SERPL-CCNC: 24 IU/L (ref 0–44)
AST SERPL-CCNC: 27 IU/L (ref 0–40)
BASOPHILS # BLD AUTO: 0.1 X10E3/UL (ref 0–0.2)
BASOPHILS NFR BLD AUTO: 1 %
BILIRUB SERPL-MCNC: 0.6 MG/DL (ref 0–1.2)
BUN SERPL-MCNC: 27 MG/DL (ref 8–27)
BUN/CREAT SERPL: 21 (ref 10–24)
CALCIUM SERPL-MCNC: 9.9 MG/DL (ref 8.6–10.2)
CHLORIDE SERPL-SCNC: 106 MMOL/L (ref 96–106)
CHOLEST SERPL-MCNC: 160 MG/DL (ref 100–199)
CHOLEST/HDLC SERPL: 4.2 RATIO (ref 0–5)
CO2 SERPL-SCNC: 20 MMOL/L (ref 20–29)
CREAT SERPL-MCNC: 1.28 MG/DL (ref 0.76–1.27)
EGFR: 63 ML/MIN/1.73
EOSINOPHIL # BLD AUTO: 0.3 X10E3/UL (ref 0–0.4)
EOSINOPHIL NFR BLD AUTO: 4 %
ERYTHROCYTE [DISTWIDTH] IN BLOOD BY AUTOMATED COUNT: 13.4 % (ref 11.6–15.4)
GLOBULIN SER-MCNC: 2.7 G/DL (ref 1.5–4.5)
GLUCOSE SERPL-MCNC: 200 MG/DL (ref 70–99)
HCT VFR BLD AUTO: 45.5 % (ref 37.5–51)
HDLC SERPL-MCNC: 38 MG/DL
HGB BLD-MCNC: 15 G/DL (ref 13–17.7)
IMM GRANULOCYTES # BLD: 0 X10E3/UL (ref 0–0.1)
IMM GRANULOCYTES NFR BLD: 0 %
LDLC SERPL CALC-MCNC: 103 MG/DL (ref 0–99)
LYMPHOCYTES # BLD AUTO: 1.7 X10E3/UL (ref 0.7–3.1)
LYMPHOCYTES NFR BLD AUTO: 23 %
MCH RBC QN AUTO: 31.5 PG (ref 26.6–33)
MCHC RBC AUTO-ENTMCNC: 33 G/DL (ref 31.5–35.7)
MCV RBC AUTO: 96 FL (ref 79–97)
MONOCYTES # BLD AUTO: 0.7 X10E3/UL (ref 0.1–0.9)
MONOCYTES NFR BLD AUTO: 9 %
NEUTROPHILS # BLD AUTO: 4.5 X10E3/UL (ref 1.4–7)
NEUTROPHILS NFR BLD AUTO: 63 %
PLATELET # BLD AUTO: 232 X10E3/UL (ref 150–450)
POTASSIUM SERPL-SCNC: 4.3 MMOL/L (ref 3.5–5.2)
PROT SERPL-MCNC: 7.1 G/DL (ref 6–8.5)
RBC # BLD AUTO: 4.76 X10E6/UL (ref 4.14–5.8)
SL AMB VLDL CHOLESTEROL CALC: 19 MG/DL (ref 5–40)
SODIUM SERPL-SCNC: 142 MMOL/L (ref 134–144)
TRIGL SERPL-MCNC: 105 MG/DL (ref 0–149)
WBC # BLD AUTO: 7.3 X10E3/UL (ref 3.4–10.8)

## 2024-10-10 ENCOUNTER — TELEPHONE (OUTPATIENT)
Dept: FAMILY MEDICINE CLINIC | Facility: CLINIC | Age: 64
End: 2024-10-10

## 2024-10-10 NOTE — TELEPHONE ENCOUNTER
I looked in the office notes.  Lorena made this appt for him.  Faxed 's surgical clearance to [phone]

## 2024-10-29 ENCOUNTER — TELEPHONE (OUTPATIENT)
Dept: FAMILY MEDICINE CLINIC | Facility: CLINIC | Age: 64
End: 2024-10-29

## 2024-10-30 DIAGNOSIS — I10 PRIMARY HYPERTENSION: Primary | ICD-10-CM

## 2024-10-30 DIAGNOSIS — E78.2 MIXED HYPERLIPIDEMIA: ICD-10-CM

## 2024-11-21 DIAGNOSIS — E78.2 MIXED HYPERLIPIDEMIA: ICD-10-CM

## 2024-11-21 DIAGNOSIS — Z86.73 HISTORY OF CVA (CEREBROVASCULAR ACCIDENT): ICD-10-CM

## 2024-11-21 DIAGNOSIS — I10 PRIMARY HYPERTENSION: ICD-10-CM

## 2024-11-21 RX ORDER — ATORVASTATIN CALCIUM 80 MG/1
80 TABLET, FILM COATED ORAL DAILY
Qty: 90 TABLET | Refills: 1 | Status: SHIPPED | OUTPATIENT
Start: 2024-11-21

## 2024-11-21 RX ORDER — AMLODIPINE BESYLATE 10 MG/1
10 TABLET ORAL DAILY
Qty: 90 TABLET | Refills: 1 | Status: SHIPPED | OUTPATIENT
Start: 2024-11-21

## 2024-11-23 LAB
ALBUMIN SERPL-MCNC: 3.6 G/DL (ref 3.9–4.9)
ALP SERPL-CCNC: 95 IU/L (ref 44–121)
ALT SERPL-CCNC: 21 IU/L (ref 0–44)
AST SERPL-CCNC: 23 IU/L (ref 0–40)
BILIRUB SERPL-MCNC: 0.4 MG/DL (ref 0–1.2)
BUN SERPL-MCNC: 16 MG/DL (ref 8–27)
BUN/CREAT SERPL: 18 (ref 10–24)
CALCIUM SERPL-MCNC: 8.8 MG/DL (ref 8.6–10.2)
CHLORIDE SERPL-SCNC: 105 MMOL/L (ref 96–106)
CHOLEST SERPL-MCNC: 130 MG/DL (ref 100–199)
CHOLEST/HDLC SERPL: 3.9 RATIO (ref 0–5)
CO2 SERPL-SCNC: 23 MMOL/L (ref 20–29)
CREAT SERPL-MCNC: 0.9 MG/DL (ref 0.76–1.27)
EGFR: 96 ML/MIN/1.73
GLOBULIN SER-MCNC: 2.5 G/DL (ref 1.5–4.5)
GLUCOSE SERPL-MCNC: 118 MG/DL (ref 70–99)
HDLC SERPL-MCNC: 33 MG/DL
LDLC SERPL CALC-MCNC: 79 MG/DL (ref 0–99)
POTASSIUM SERPL-SCNC: 4.3 MMOL/L (ref 3.5–5.2)
PROT SERPL-MCNC: 6.1 G/DL (ref 6–8.5)
SL AMB VLDL CHOLESTEROL CALC: 18 MG/DL (ref 5–40)
SODIUM SERPL-SCNC: 141 MMOL/L (ref 134–144)
TRIGL SERPL-MCNC: 91 MG/DL (ref 0–149)

## 2024-11-25 DIAGNOSIS — I10 PRIMARY HYPERTENSION: ICD-10-CM

## 2024-11-25 RX ORDER — CARVEDILOL 12.5 MG/1
12.5 TABLET ORAL DAILY
Qty: 90 TABLET | Refills: 1 | Status: SHIPPED | OUTPATIENT
Start: 2024-11-25

## 2024-11-25 NOTE — TELEPHONE ENCOUNTER
Medication: carvedilol (COREG) 12.5 mg tablet     Dose/Frequency: Take 1 tablet (12.5 mg total) by mouth in the morning     Quantity: 90    Pharmacy: [id number] - [place] [address] [address]     Office:   [x] PCP/Provider -   [] Speciality/Provider -     Does the patient have enough for 3 days?   [] Yes   [x] No - Send as HP to POD

## 2024-12-04 PROBLEM — Z01.818 PREOPERATIVE CLEARANCE: Status: RESOLVED | Noted: 2024-10-08 | Resolved: 2024-12-04

## 2024-12-04 NOTE — PATIENT INSTRUCTIONS
CONTINUE CURRENT TREATMENT PLAN  MONITOR DIETARY SODIUM, CHOL INTAKE  ENCOURAGE PHYSICAL ACTIVITY      RV 6 M, SOONER PRN      Recent Results (from the past 4 weeks)   Lipid panel    Collection Time: 11/22/24  7:44 AM   Result Value Ref Range    Cholesterol, Total 130 100 - 199 mg/dL    Triglycerides 91 0 - 149 mg/dL    HDL 33 (L) >39 mg/dL    VLDL Cholesterol Calculated 18 5 - 40 mg/dL    LDL Calculated 79 0 - 99 mg/dL    T. Chol/HDL Ratio 3.9 0.0 - 5.0 ratio   Comprehensive metabolic panel    Collection Time: 11/22/24  7:44 AM   Result Value Ref Range    Glucose, Random 118 (H) 70 - 99 mg/dL    BUN 16 8 - 27 mg/dL    Creatinine 0.90 0.76 - 1.27 mg/dL    eGFR 96 >59 mL/min/1.73    SL AMB BUN/CREATININE RATIO 18 10 - 24    Sodium 141 134 - 144 mmol/L    Potassium 4.3 3.5 - 5.2 mmol/L    Chloride 105 96 - 106 mmol/L    CO2 23 20 - 29 mmol/L    CALCIUM 8.8 8.6 - 10.2 mg/dL    Protein, Total 6.1 6.0 - 8.5 g/dL    Albumin 3.6 (L) 3.9 - 4.9 g/dL    Globulin, Total 2.5 1.5 - 4.5 g/dL    TOTAL BILIRUBIN 0.4 0.0 - 1.2 mg/dL    Alk Phos Isoenzymes 95 44 - 121 IU/L    AST 23 0 - 40 IU/L    ALT 21 0 - 44 IU/L

## 2024-12-04 NOTE — ASSESSMENT & PLAN NOTE
WATCHING CHOLESTEROL INTAKE  COMPLIANT WITH MEDICATION  NO CONCERNS    - CONTINUE TO MONITOR DIETARY CHOL INTAKE  - CONTINUE CURRENT MEDICATION  - ENCOURAGED PHYSICAL ACTIVITY      Orders:    rosuvastatin (CRESTOR) 40 MG tablet; Take 1 tablet (40 mg total) by mouth daily

## 2024-12-05 ENCOUNTER — OFFICE VISIT (OUTPATIENT)
Dept: FAMILY MEDICINE CLINIC | Facility: CLINIC | Age: 64
End: 2024-12-05
Payer: COMMERCIAL

## 2024-12-05 VITALS
DIASTOLIC BLOOD PRESSURE: 80 MMHG | WEIGHT: 201.4 LBS | SYSTOLIC BLOOD PRESSURE: 128 MMHG | HEIGHT: 68 IN | HEART RATE: 80 BPM | OXYGEN SATURATION: 94 % | BODY MASS INDEX: 30.52 KG/M2 | TEMPERATURE: 97.7 F | RESPIRATION RATE: 16 BRPM

## 2024-12-05 DIAGNOSIS — I10 BENIGN ESSENTIAL HYPERTENSION: Primary | ICD-10-CM

## 2024-12-05 DIAGNOSIS — E78.2 MIXED HYPERLIPIDEMIA: ICD-10-CM

## 2024-12-05 DIAGNOSIS — Z86.73 HISTORY OF CVA (CEREBROVASCULAR ACCIDENT): ICD-10-CM

## 2024-12-05 PROCEDURE — 99214 OFFICE O/P EST MOD 30 MIN: CPT | Performed by: FAMILY MEDICINE

## 2024-12-05 RX ORDER — ROSUVASTATIN CALCIUM 40 MG/1
40 TABLET, COATED ORAL DAILY
Qty: 100 TABLET | Refills: 3 | Status: SHIPPED | OUTPATIENT
Start: 2024-12-05

## 2024-12-05 NOTE — PROGRESS NOTES
Name: Doron Braxton      : 1960      MRN: [id number]  Encounter Provider: Ruben Church MD  Encounter Date: 2024   Encounter department: [facility]    Assessment & Plan  Benign essential hypertension  STABLE  DENIES ANY CP, SOB, PALPITATIONS, OR HEADACHE  NOTES NO WATER RETENTION  COMPLIANT WITH MEDICATION  NO CONCERNS    - CONTINUE CURRENT TREATMENT PLAN  - MONITOR DIETARY SODIUM INTAKE  - ENCOURAGE PHYSICAL ACTIVITY  - RV 3 MONTHS           Mixed hyperlipidemia  WATCHING CHOLESTEROL INTAKE  COMPLIANT WITH MEDICATION  NO CONCERNS    - CONTINUE TO MONITOR DIETARY CHOL INTAKE  - CONTINUE CURRENT MEDICATION  - ENCOURAGED PHYSICAL ACTIVITY      Orders:    rosuvastatin (CRESTOR) 40 MG tablet; Take 1 tablet (40 mg total) by mouth daily    History of CVA (cerebrovascular accident)              History of Present Illness     DISCUSSED HEALTH ISSUES  REVIEWED MEDICAL RECORD  NO CONCERNS AT THIS TIME          Review of Systems   Constitutional:  Negative for chills, fatigue and fever.   HENT:  Negative for congestion, ear discharge, ear pain, mouth sores, postnasal drip, sore throat and trouble swallowing.    Eyes:  Negative for pain, discharge and visual disturbance.   Respiratory:  Negative for cough, shortness of breath and wheezing.    Cardiovascular:  Negative for chest pain, palpitations and leg swelling.   Gastrointestinal:  Negative for abdominal distention, abdominal pain, blood in stool, diarrhea and nausea.   Endocrine: Negative for polydipsia, polyphagia and polyuria.   Genitourinary:  Negative for dysuria, frequency, hematuria and urgency.   Musculoskeletal:  Negative for arthralgias, gait problem and joint swelling.   Skin:  Negative for pallor and rash.   Neurological:  Negative for dizziness, syncope, speech difficulty, weakness, light-headedness, numbness and headaches.   Hematological:  Negative for adenopathy.   Psychiatric/Behavioral:  Negative for  "behavioral problems, confusion and sleep disturbance. The patient is not nervous/anxious.      History reviewed. No pertinent past medical history.  History reviewed. No pertinent surgical history.  History reviewed. No pertinent family history.  Social History     Tobacco Use    Smoking status: Never     Passive exposure: Never    Smokeless tobacco: Never   Vaping Use    Vaping status: Never Used   Substance and Sexual Activity    Alcohol use: Yes     Alcohol/week: 2.0 standard drinks of alcohol     Types: 2 Cans of beer per week     Comment: socially    Drug use: Never    Sexual activity: Not on file     Current Outpatient Medications on File Prior to Visit   Medication Sig    amLODIPine (NORVASC) 10 mg tablet Take 1 tablet (10 mg total) by mouth daily    aspirin (Miniprin Low Dose) 81 mg EC tablet Take 1 tablet by mouth daily    carvedilol (COREG) 12.5 mg tablet Take 1 tablet (12.5 mg total) by mouth in the morning    carvedilol (COREG) 25 mg tablet Take 1 tablet (25 mg total) by mouth daily    [DISCONTINUED] atorvastatin (LIPITOR) 80 mg tablet Take 1 tablet (80 mg total) by mouth daily     No Known Allergies  Immunization History   Administered Date(s) Administered    COVID-19 MODERNA VACC 0.5 ML IM 04/25/2021, 05/23/2021    Tdap 04/20/2011     Objective   /80   Pulse 80   Temp 97.7 °F (36.5 °C)   Resp 16   Ht 5' 7.5\" (1.715 m)   Wt 91.4 kg (201 lb 6.4 oz)   SpO2 94%   BMI 31.08 kg/m²     Physical Exam  Constitutional:       General: He is not in acute distress.     Appearance: Normal appearance. He is well-developed. He is not ill-appearing.   HENT:      Head: Normocephalic and atraumatic.      Right Ear: Tympanic membrane and external ear normal.      Left Ear: Tympanic membrane and external ear normal.      Nose: Nose normal.      Mouth/Throat:      Mouth: Mucous membranes are moist.   Eyes:      General:         Right eye: No discharge.         Left eye: No discharge.      Conjunctiva/sclera: " Conjunctivae normal.      Pupils: Pupils are equal, round, and reactive to light.   Neck:      Thyroid: No thyromegaly.      Vascular: No JVD.   Cardiovascular:      Rate and Rhythm: Normal rate and regular rhythm.      Heart sounds: Normal heart sounds. No murmur heard.  Pulmonary:      Effort: Pulmonary effort is normal.      Breath sounds: Normal breath sounds. No wheezing or rales.   Abdominal:      General: Bowel sounds are normal.      Palpations: Abdomen is soft. There is no mass.      Tenderness: There is no abdominal tenderness. There is no guarding or rebound.   Genitourinary:     Penis: Normal.       Testes: Normal.      Prostate: Normal.      Rectum: Normal. Guaiac result negative.   Musculoskeletal:         General: No tenderness or deformity. Normal range of motion.      Cervical back: Neck supple.   Lymphadenopathy:      Cervical: No cervical adenopathy.   Skin:     General: Skin is warm and dry.      Findings: No erythema or rash.   Neurological:      General: No focal deficit present.      Mental Status: He is alert and oriented to person, place, and time.      Cranial Nerves: No cranial nerve deficit.      Sensory: No sensory deficit.      Motor: No weakness or abnormal muscle tone.      Coordination: Coordination normal.      Gait: Gait normal.      Deep Tendon Reflexes: Reflexes are normal and symmetric. Reflexes normal.   Psychiatric:         Mood and Affect: Mood normal.         Behavior: Behavior normal.         Thought Content: Thought content normal.         Judgment: Judgment normal.

## 2024-12-05 NOTE — LETTER
KAY ARRIETA      Current Outpatient Medications:     amLODIPine (NORVASC) 10 mg tablet, Take 1 tablet (10 mg total) by mouth daily, Disp: 90 tablet, Rfl: 1    aspirin (Miniprin Low Dose) 81 mg EC tablet, Take 1 tablet by mouth daily, Disp: , Rfl:     atorvastatin (LIPITOR) 80 mg tablet, Take 1 tablet (80 mg total) by mouth daily, Disp: 90 tablet, Rfl: 1    carvedilol (COREG) 12.5 mg tablet, Take 1 tablet (12.5 mg total) by mouth in the morning, Disp: 90 tablet, Rfl: 1    carvedilol (COREG) 25 mg tablet, Take 1 tablet (25 mg total) by mouth daily, Disp: 90 tablet, Rfl: 1    Recent Results (from the past 4 weeks)   Lipid panel    Collection Time: 11/22/24  7:44 AM   Result Value Ref Range    Cholesterol, Total 130 100 - 199 mg/dL    Triglycerides 91 0 - 149 mg/dL    HDL 33 (L) >39 mg/dL    VLDL Cholesterol Calculated 18 5 - 40 mg/dL    LDL Calculated 79 0 - 99 mg/dL    T. Chol/HDL Ratio 3.9 0.0 - 5.0 ratio   Comprehensive metabolic panel    Collection Time: 11/22/24  7:44 AM   Result Value Ref Range    Glucose, Random 118 (H) 70 - 99 mg/dL    BUN 16 8 - 27 mg/dL    Creatinine 0.90 0.76 - 1.27 mg/dL    eGFR 96 >59 mL/min/1.73    SL AMB BUN/CREATININE RATIO 18 10 - 24    Sodium 141 134 - 144 mmol/L    Potassium 4.3 3.5 - 5.2 mmol/L    Chloride 105 96 - 106 mmol/L    CO2 23 20 - 29 mmol/L    CALCIUM 8.8 8.6 - 10.2 mg/dL    Protein, Total 6.1 6.0 - 8.5 g/dL    Albumin 3.6 (L) 3.9 - 4.9 g/dL    Globulin, Total 2.5 1.5 - 4.5 g/dL    TOTAL BILIRUBIN 0.4 0.0 - 1.2 mg/dL    Alk Phos Isoenzymes 95 44 - 121 IU/L    AST 23 0 - 40 IU/L    ALT 21 0 - 44 IU/L

## 2024-12-14 DIAGNOSIS — I10 PRIMARY HYPERTENSION: ICD-10-CM

## 2024-12-14 DIAGNOSIS — Z86.73 HISTORY OF CVA (CEREBROVASCULAR ACCIDENT): ICD-10-CM

## 2024-12-15 RX ORDER — AMLODIPINE BESYLATE 10 MG/1
10 TABLET ORAL DAILY
Qty: 90 TABLET | Refills: 0 | Status: SHIPPED | OUTPATIENT
Start: 2024-12-15

## 2024-12-15 RX ORDER — CARVEDILOL 12.5 MG/1
12.5 TABLET ORAL DAILY
Qty: 90 TABLET | Refills: 0 | Status: SHIPPED | OUTPATIENT
Start: 2024-12-15

## 2024-12-15 RX ORDER — CARVEDILOL 25 MG/1
25 TABLET ORAL DAILY
Qty: 90 TABLET | Refills: 0 | Status: SHIPPED | OUTPATIENT
Start: 2024-12-15

## 2025-01-18 DIAGNOSIS — Z86.73 HISTORY OF CVA (CEREBROVASCULAR ACCIDENT): ICD-10-CM

## 2025-01-18 DIAGNOSIS — E78.2 MIXED HYPERLIPIDEMIA: ICD-10-CM

## 2025-01-18 DIAGNOSIS — I10 PRIMARY HYPERTENSION: ICD-10-CM

## 2025-01-20 RX ORDER — CARVEDILOL 25 MG/1
25 TABLET ORAL DAILY
Qty: 90 TABLET | Refills: 1 | Status: SHIPPED | OUTPATIENT
Start: 2025-01-20

## 2025-01-20 RX ORDER — AMLODIPINE BESYLATE 10 MG/1
10 TABLET ORAL DAILY
Qty: 90 TABLET | Refills: 1 | Status: SHIPPED | OUTPATIENT
Start: 2025-01-20

## 2025-01-20 RX ORDER — ROSUVASTATIN CALCIUM 40 MG/1
40 TABLET, COATED ORAL DAILY
Qty: 90 TABLET | Refills: 1 | Status: SHIPPED | OUTPATIENT
Start: 2025-01-20

## 2025-01-20 RX ORDER — CARVEDILOL 12.5 MG/1
12.5 TABLET ORAL DAILY
Qty: 90 TABLET | Refills: 1 | Status: SHIPPED | OUTPATIENT
Start: 2025-01-20

## 2025-05-15 DIAGNOSIS — Z12.5 ENCOUNTER FOR PROSTATE CANCER SCREENING: ICD-10-CM

## 2025-05-15 DIAGNOSIS — D64.9 ANEMIA, UNSPECIFIED TYPE: ICD-10-CM

## 2025-05-15 DIAGNOSIS — Z00.00 ROUTINE GENERAL MEDICAL EXAMINATION AT A HEALTH CARE FACILITY: Primary | ICD-10-CM

## 2025-05-15 DIAGNOSIS — Z13.29 SCREENING FOR HYPOTHYROIDISM: ICD-10-CM

## 2025-05-15 DIAGNOSIS — I10 PRIMARY HYPERTENSION: ICD-10-CM

## 2025-05-15 DIAGNOSIS — E78.2 MIXED HYPERLIPIDEMIA: ICD-10-CM

## 2025-06-09 LAB
BASOPHILS # BLD AUTO: 0.1 X10E3/UL (ref 0–0.2)
BASOPHILS NFR BLD AUTO: 1 %
EOSINOPHIL # BLD AUTO: 0.3 X10E3/UL (ref 0–0.4)
EOSINOPHIL NFR BLD AUTO: 4 %
ERYTHROCYTE [DISTWIDTH] IN BLOOD BY AUTOMATED COUNT: 12.7 % (ref 11.6–15.4)
HCT VFR BLD AUTO: 43.9 % (ref 37.5–51)
HGB BLD-MCNC: 14.9 G/DL (ref 13–17.7)
IMM GRANULOCYTES # BLD: 0 X10E3/UL (ref 0–0.1)
IMM GRANULOCYTES NFR BLD: 0 %
LYMPHOCYTES # BLD AUTO: 2.1 X10E3/UL (ref 0.7–3.1)
LYMPHOCYTES NFR BLD AUTO: 25 %
MCH RBC QN AUTO: 31.7 PG (ref 26.6–33)
MCHC RBC AUTO-ENTMCNC: 33.9 G/DL (ref 31.5–35.7)
MCV RBC AUTO: 93 FL (ref 79–97)
MONOCYTES # BLD AUTO: 0.8 X10E3/UL (ref 0.1–0.9)
MONOCYTES NFR BLD AUTO: 9 %
NEUTROPHILS # BLD AUTO: 4.9 X10E3/UL (ref 1.4–7)
NEUTROPHILS NFR BLD AUTO: 61 %
PLATELET # BLD AUTO: 250 X10E3/UL (ref 150–450)
RBC # BLD AUTO: 4.7 X10E6/UL (ref 4.14–5.8)
WBC # BLD AUTO: 8.2 X10E3/UL (ref 3.4–10.8)

## 2025-06-10 LAB
ALBUMIN SERPL-MCNC: 4.3 G/DL (ref 3.9–4.9)
ALP SERPL-CCNC: 117 IU/L (ref 44–121)
ALT SERPL-CCNC: 22 IU/L (ref 0–44)
AST SERPL-CCNC: 26 IU/L (ref 0–40)
BILIRUB SERPL-MCNC: 0.6 MG/DL (ref 0–1.2)
BUN SERPL-MCNC: 16 MG/DL (ref 8–27)
BUN/CREAT SERPL: 17 (ref 10–24)
CALCIUM SERPL-MCNC: 9.6 MG/DL (ref 8.6–10.2)
CHLORIDE SERPL-SCNC: 104 MMOL/L (ref 96–106)
CHOLEST SERPL-MCNC: 131 MG/DL (ref 100–199)
CHOLEST/HDLC SERPL: 3.5 RATIO (ref 0–5)
CO2 SERPL-SCNC: 18 MMOL/L (ref 20–29)
CREAT SERPL-MCNC: 0.92 MG/DL (ref 0.76–1.27)
EGFR: 93 ML/MIN/1.73
GLOBULIN SER-MCNC: 2.6 G/DL (ref 1.5–4.5)
GLUCOSE SERPL-MCNC: 98 MG/DL (ref 70–99)
HDLC SERPL-MCNC: 37 MG/DL
LDLC SERPL CALC-MCNC: 73 MG/DL (ref 0–99)
POTASSIUM SERPL-SCNC: 4.3 MMOL/L (ref 3.5–5.2)
PROT SERPL-MCNC: 6.9 G/DL (ref 6–8.5)
PSA SERPL-MCNC: 2.8 NG/ML (ref 0–4)
SL AMB REFLEX CRITERIA: NORMAL
SL AMB VLDL CHOLESTEROL CALC: 21 MG/DL (ref 5–40)
SODIUM SERPL-SCNC: 138 MMOL/L (ref 134–144)
TRIGL SERPL-MCNC: 115 MG/DL (ref 0–149)
TSH SERPL DL<=0.005 MIU/L-ACNC: 4.85 UIU/ML (ref 0.45–4.5)

## 2025-06-12 DIAGNOSIS — E78.2 MIXED HYPERLIPIDEMIA: ICD-10-CM

## 2025-06-12 DIAGNOSIS — Z86.73 HISTORY OF CVA (CEREBROVASCULAR ACCIDENT): ICD-10-CM

## 2025-06-12 DIAGNOSIS — I10 PRIMARY HYPERTENSION: ICD-10-CM

## 2025-06-12 RX ORDER — CARVEDILOL 12.5 MG/1
12.5 TABLET ORAL EVERY MORNING
Qty: 90 TABLET | Refills: 1 | Status: SHIPPED | OUTPATIENT
Start: 2025-06-12

## 2025-06-12 RX ORDER — ROSUVASTATIN CALCIUM 40 MG/1
40 TABLET, COATED ORAL DAILY
Qty: 90 TABLET | Refills: 1 | Status: SHIPPED | OUTPATIENT
Start: 2025-06-12

## 2025-06-12 RX ORDER — AMLODIPINE BESYLATE 10 MG/1
10 TABLET ORAL DAILY
Qty: 90 TABLET | Refills: 1 | Status: SHIPPED | OUTPATIENT
Start: 2025-06-12

## 2025-06-12 RX ORDER — CARVEDILOL 25 MG/1
25 TABLET ORAL DAILY
Qty: 90 TABLET | Refills: 1 | Status: SHIPPED | OUTPATIENT
Start: 2025-06-12

## 2025-06-12 NOTE — PATIENT INSTRUCTIONS
DISCUSSED HEALTH MAINTENENCE ISSUES  BW WILL BE OBTAINED  ENCOURAGED HEALTHY DIET AND EXERCISE  COLONOSCOPY WILL BE ORDERED  RV FOR ANNUAL HEALTH EXAM IN 1 YEAR  RV SOONER IF THERE ARE ANY CONCERNS    RV 6M    Recent Results (from the past 4 weeks)   CBC and differential    Collection Time: 06/09/25  9:49 AM   Result Value Ref Range    White Blood Cell Count 8.2 3.4 - 10.8 x10E3/uL    Red Blood Cell Count 4.70 4.14 - 5.80 x10E6/uL    Hemoglobin 14.9 13.0 - 17.7 g/dL    HCT 43.9 37.5 - 51.0 %    MCV 93 79 - 97 fL    MCH 31.7 26.6 - 33.0 pg    MCHC 33.9 31.5 - 35.7 g/dL    RDW 12.7 11.6 - 15.4 %    Platelet Count 250 150 - 450 x10E3/uL    Neutrophils 61 Not Estab. %    Lymphocytes 25 Not Estab. %    Monocytes 9 Not Estab. %    Eosinophils 4 Not Estab. %    Basophils PCT 1 Not Estab. %    Neutrophils (Absolute) 4.9 1.4 - 7.0 x10E3/uL    Lymphocytes (Absolute) 2.1 0.7 - 3.1 x10E3/uL    Monocytes (Absolute) 0.8 0.1 - 0.9 x10E3/uL    Eosinophils (Absolute) 0.3 0.0 - 0.4 x10E3/uL    Basophils ABS 0.1 0.0 - 0.2 x10E3/uL    Immature Granulocytes 0 Not Estab. %    Immature Granulocytes (Absolute) 0.0 0.0 - 0.1 x10E3/uL   Comprehensive metabolic panel    Collection Time: 06/09/25  9:49 AM   Result Value Ref Range    Glucose, Random 98 70 - 99 mg/dL    BUN 16 8 - 27 mg/dL    Creatinine 0.92 0.76 - 1.27 mg/dL    eGFR 93 >59 mL/min/1.73    SL AMB BUN/CREATININE RATIO 17 10 - 24    Sodium 138 134 - 144 mmol/L    Potassium 4.3 3.5 - 5.2 mmol/L    Chloride 104 96 - 106 mmol/L    CO2 18 (L) 20 - 29 mmol/L    CALCIUM 9.6 8.6 - 10.2 mg/dL    Protein, Total 6.9 6.0 - 8.5 g/dL    Albumin 4.3 3.9 - 4.9 g/dL    Globulin, Total 2.6 1.5 - 4.5 g/dL    TOTAL BILIRUBIN 0.6 0.0 - 1.2 mg/dL    Alk Phos Isoenzymes 117 44 - 121 IU/L    AST 26 0 - 40 IU/L    ALT 22 0 - 44 IU/L   Lipid panel    Collection Time: 06/09/25  9:49 AM   Result Value Ref Range    Cholesterol, Total 131 100 - 199 mg/dL    Triglycerides 115 0 - 149 mg/dL    HDL 37 (L) >39  mg/dL    VLDL Cholesterol Calculated 21 5 - 40 mg/dL    LDL Calculated 73 0 - 99 mg/dL    T. Chol/HDL Ratio 3.5 0.0 - 5.0 ratio   PSA Total (Reflex To Free)    Collection Time: 06/09/25  9:49 AM   Result Value Ref Range    Prostate Specific Antigen Total 2.8 0.0 - 4.0 ng/mL    Reflex Criteria Comment    TSH, 3rd generation    Collection Time: 06/09/25  9:49 AM   Result Value Ref Range    TSH 4.850 (H) 0.450 - 4.500 uIU/mL

## 2025-06-12 NOTE — TELEPHONE ENCOUNTER
Requested medication(s) are due for refill today: Yes  Patient has already received a courtesy refill: No  Other reason request has been forwarded to provider: Patient scheduled for OV 6/16

## 2025-06-16 ENCOUNTER — OFFICE VISIT (OUTPATIENT)
Dept: FAMILY MEDICINE CLINIC | Facility: CLINIC | Age: 65
End: 2025-06-16
Payer: COMMERCIAL

## 2025-06-16 VITALS
TEMPERATURE: 97.8 F | HEIGHT: 67 IN | RESPIRATION RATE: 18 BRPM | DIASTOLIC BLOOD PRESSURE: 80 MMHG | SYSTOLIC BLOOD PRESSURE: 132 MMHG | HEART RATE: 58 BPM | BODY MASS INDEX: 32.74 KG/M2 | WEIGHT: 208.6 LBS | OXYGEN SATURATION: 96 %

## 2025-06-16 DIAGNOSIS — Z12.11 COLON CANCER SCREENING: ICD-10-CM

## 2025-06-16 DIAGNOSIS — Z86.73 HISTORY OF CVA (CEREBROVASCULAR ACCIDENT): ICD-10-CM

## 2025-06-16 DIAGNOSIS — E78.2 MIXED HYPERLIPIDEMIA: ICD-10-CM

## 2025-06-16 DIAGNOSIS — I10 BENIGN ESSENTIAL HYPERTENSION: ICD-10-CM

## 2025-06-16 DIAGNOSIS — R79.89 ELEVATED TSH: ICD-10-CM

## 2025-06-16 DIAGNOSIS — E55.9 VITAMIN D DEFICIENCY: ICD-10-CM

## 2025-06-16 DIAGNOSIS — Z13.29 SCREENING FOR HYPOTHYROIDISM: ICD-10-CM

## 2025-06-16 DIAGNOSIS — Z12.5 ENCOUNTER FOR PROSTATE CANCER SCREENING: ICD-10-CM

## 2025-06-16 DIAGNOSIS — Z00.00 ROUTINE GENERAL MEDICAL EXAMINATION AT A HEALTH CARE FACILITY: Primary | ICD-10-CM

## 2025-06-16 LAB — SL AMB POCT FECES OCC BLD: NORMAL

## 2025-06-16 PROCEDURE — 99396 PREV VISIT EST AGE 40-64: CPT | Performed by: FAMILY MEDICINE

## 2025-06-16 PROCEDURE — 82270 OCCULT BLOOD FECES: CPT | Performed by: FAMILY MEDICINE

## 2025-06-16 NOTE — LETTER
EMANUEL ARRIETA      Current Outpatient Medications:     amLODIPine (NORVASC) 10 mg tablet, Take 1 tablet (10 mg total) by mouth daily, Disp: 90 tablet, Rfl: 1    aspirin (Miniprin Low Dose) 81 mg EC tablet, Take 1 tablet by mouth daily, Disp: , Rfl:     carvedilol (COREG) 12.5 mg tablet, Take 1 tablet (12.5 mg total) by mouth in the morning, Disp: 90 tablet, Rfl: 1    carvedilol (COREG) 25 mg tablet, Take 1 tablet (25 mg total) by mouth daily, Disp: 90 tablet, Rfl: 1    rosuvastatin (CRESTOR) 40 MG tablet, Take 1 tablet (40 mg total) by mouth daily, Disp: 90 tablet, Rfl: 1      Recent Results (from the past 4 weeks)   CBC and differential    Collection Time: 06/09/25  9:49 AM   Result Value Ref Range    White Blood Cell Count 8.2 3.4 - 10.8 x10E3/uL    Red Blood Cell Count 4.70 4.14 - 5.80 x10E6/uL    Hemoglobin 14.9 13.0 - 17.7 g/dL    HCT 43.9 37.5 - 51.0 %    MCV 93 79 - 97 fL    MCH 31.7 26.6 - 33.0 pg    MCHC 33.9 31.5 - 35.7 g/dL    RDW 12.7 11.6 - 15.4 %    Platelet Count 250 150 - 450 x10E3/uL    Neutrophils 61 Not Estab. %    Lymphocytes 25 Not Estab. %    Monocytes 9 Not Estab. %    Eosinophils 4 Not Estab. %    Basophils PCT 1 Not Estab. %    Neutrophils (Absolute) 4.9 1.4 - 7.0 x10E3/uL    Lymphocytes (Absolute) 2.1 0.7 - 3.1 x10E3/uL    Monocytes (Absolute) 0.8 0.1 - 0.9 x10E3/uL    Eosinophils (Absolute) 0.3 0.0 - 0.4 x10E3/uL    Basophils ABS 0.1 0.0 - 0.2 x10E3/uL    Immature Granulocytes 0 Not Estab. %    Immature Granulocytes (Absolute) 0.0 0.0 - 0.1 x10E3/uL   Comprehensive metabolic panel    Collection Time: 06/09/25  9:49 AM   Result Value Ref Range    Glucose, Random 98 70 - 99 mg/dL    BUN 16 8 - 27 mg/dL    Creatinine 0.92 0.76 - 1.27 mg/dL    eGFR 93 >59 mL/min/1.73    SL AMB BUN/CREATININE RATIO 17 10 - 24    Sodium 138 134 - 144 mmol/L    Potassium 4.3 3.5 - 5.2 mmol/L    Chloride 104 96 - 106 mmol/L    CO2 18 (L) 20 - 29 mmol/L    CALCIUM 9.6 8.6 - 10.2 mg/dL    Protein, Total 6.9 6.0 -  8.5 g/dL    Albumin 4.3 3.9 - 4.9 g/dL    Globulin, Total 2.6 1.5 - 4.5 g/dL    TOTAL BILIRUBIN 0.6 0.0 - 1.2 mg/dL    Alk Phos Isoenzymes 117 44 - 121 IU/L    AST 26 0 - 40 IU/L    ALT 22 0 - 44 IU/L   Lipid panel    Collection Time: 06/09/25  9:49 AM   Result Value Ref Range    Cholesterol, Total 131 100 - 199 mg/dL    Triglycerides 115 0 - 149 mg/dL    HDL 37 (L) >39 mg/dL    VLDL Cholesterol Calculated 21 5 - 40 mg/dL    LDL Calculated 73 0 - 99 mg/dL    T. Chol/HDL Ratio 3.5 0.0 - 5.0 ratio   PSA Total (Reflex To Free)    Collection Time: 06/09/25  9:49 AM   Result Value Ref Range    Prostate Specific Antigen Total 2.8 0.0 - 4.0 ng/mL    Reflex Criteria Comment    TSH, 3rd generation    Collection Time: 06/09/25  9:49 AM   Result Value Ref Range    TSH 4.850 (H) 0.450 - 4.500 uIU/mL

## 2025-06-16 NOTE — PROGRESS NOTES
Adult Annual Physical  Name: Doron Braxton      : 1960      MRN: [id number]  Encounter Provider: Ruben Church MD  Encounter Date: 2025   Encounter department: [facility]    :  Assessment & Plan  Routine general medical examination at a health care facility         Benign essential hypertension  STABLE  DENIES ANY CP, SOB, PALPITATIONS, OR HEADACHE  NOTES NO WATER RETENTION  COMPLIANT WITH MEDICATION  NO CONCERNS    - CONTINUE CURRENT TREATMENT PLAN  - MONITOR DIETARY SODIUM INTAKE  - ENCOURAGE PHYSICAL ACTIVITY  - RV 3 MONTHS         Mixed hyperlipidemia  WATCHING CHOLESTEROL INTAKE  COMPLIANT WITH MEDICATION  NO CONCERNS    - CONTINUE TO MONITOR DIETARY CHOL INTAKE  - CONTINUE CURRENT MEDICATION  - ENCOURAGED PHYSICAL ACTIVITY         History of CVA (cerebrovascular accident)         Vitamin D deficiency         Screening for hypothyroidism         Encounter for prostate cancer screening         Colon cancer screening    Orders:  •  POCT hemoccult screening  •  Cologuard    Elevated TSH    Orders:  •  TSH, 3rd generation; Future  •  T4, free; Future        Preventive Screenings:  - Diabetes Screening: screening up-to-date  - Cholesterol Screening: screening not indicated and has hyperlipidemia   - Hepatitis C screening: screening up-to-date   - HIV screening: screening up-to-date   - Colon cancer screening: orders placed   - Lung cancer screening: screening not indicated   - Prostate cancer screening: screening up-to-date     Immunizations:  - Immunizations due: Prevnar 20, Tdap and Zoster (Shingrix)      Depression Screening and Follow-up Plan: Patient was screened for depression during today's encounter. They screened negative with a PHQ-2 score of 0.          History of Present Illness     Adult Annual Physical:  Patient presents for annual physical. DISCUSSED HEALTH ISSUES  REVIEWED MEDICAL RECORD  NO CONCERNS AT THIS TIME    .     Diet and Physical Activity:  -  "Diet/Nutrition: no special diet.  - Exercise: moderate cardiovascular exercise and 3-4 times a week on average.    Depression Screening:  - PHQ-2 Score: 0    General Health:  - Sleep: sleeps well.  - Hearing: normal hearing bilateral ears.  - Vision: wears glasses.  - Dental: no dental visits for > 1 year and brushes teeth once daily.    /GYN Health:  - Follows with GYN: no.   - History of STDs: no     Health:  - History of STDs: no.   - Urinary symptoms: none.     Advanced Care Planning:  - Has an advanced directive?: no    - Has a durable medical POA?: no      Review of Systems   Constitutional:  Negative for chills, fatigue and fever.   HENT:  Negative for congestion, ear discharge, ear pain, mouth sores, postnasal drip, sore throat and trouble swallowing.    Eyes:  Negative for pain, discharge and visual disturbance.   Respiratory:  Negative for cough, shortness of breath and wheezing.    Cardiovascular:  Negative for chest pain, palpitations and leg swelling.   Gastrointestinal:  Negative for abdominal distention, abdominal pain, blood in stool, diarrhea and nausea.   Endocrine: Negative for polydipsia, polyphagia and polyuria.   Genitourinary:  Negative for dysuria, frequency, hematuria and urgency.   Musculoskeletal:  Negative for arthralgias, gait problem and joint swelling.   Skin:  Negative for pallor and rash.   Neurological:  Negative for dizziness, syncope, speech difficulty, weakness, light-headedness, numbness and headaches.   Hematological:  Negative for adenopathy.   Psychiatric/Behavioral:  Negative for behavioral problems, confusion and sleep disturbance. The patient is not nervous/anxious.          Objective   /80   Pulse 58   Temp 97.8 °F (36.6 °C)   Resp 18   Ht 5' 7.25\" (1.708 m)   Wt 94.6 kg (208 lb 9.6 oz)   SpO2 96%   BMI 32.43 kg/m²     Physical Exam  Constitutional:       General: He is not in acute distress.     Appearance: Normal appearance. He is well-developed. He is " obese. He is not ill-appearing.   HENT:      Head: Normocephalic and atraumatic.      Right Ear: Tympanic membrane and external ear normal.      Left Ear: Tympanic membrane and external ear normal.      Nose: Nose normal.      Mouth/Throat:      Mouth: Mucous membranes are moist.     Eyes:      General:         Right eye: No discharge.         Left eye: No discharge.      Conjunctiva/sclera: Conjunctivae normal.      Pupils: Pupils are equal, round, and reactive to light.     Neck:      Thyroid: No thyromegaly.      Vascular: No JVD.     Cardiovascular:      Rate and Rhythm: Normal rate and regular rhythm.      Heart sounds: Normal heart sounds. No murmur heard.  Pulmonary:      Effort: Pulmonary effort is normal.      Breath sounds: Normal breath sounds. No wheezing or rales.   Abdominal:      General: Bowel sounds are normal.      Palpations: Abdomen is soft. There is no mass.      Tenderness: There is no abdominal tenderness. There is no guarding or rebound.   Genitourinary:     Penis: Normal.       Testes: Normal.      Prostate: Normal.      Rectum: Normal. Guaiac result negative.     Musculoskeletal:         General: No tenderness or deformity. Normal range of motion.      Cervical back: Neck supple.   Lymphadenopathy:      Cervical: No cervical adenopathy.     Skin:     General: Skin is warm and dry.      Findings: No erythema or rash.     Neurological:      General: No focal deficit present.      Mental Status: He is alert and oriented to person, place, and time.      Cranial Nerves: No cranial nerve deficit.      Sensory: No sensory deficit.      Motor: No weakness or abnormal muscle tone.      Coordination: Coordination normal.      Gait: Gait normal.      Deep Tendon Reflexes: Reflexes are normal and symmetric. Reflexes normal.     Psychiatric:         Mood and Affect: Mood normal.         Behavior: Behavior normal.         Thought Content: Thought content normal.         Judgment: Judgment normal.